# Patient Record
Sex: MALE | Race: WHITE
[De-identification: names, ages, dates, MRNs, and addresses within clinical notes are randomized per-mention and may not be internally consistent; named-entity substitution may affect disease eponyms.]

---

## 2017-03-09 VITALS — SYSTOLIC BLOOD PRESSURE: 129 MMHG | DIASTOLIC BLOOD PRESSURE: 84 MMHG

## 2017-03-13 LAB — HGB BLD-MCNC: 16.6 G/DL (ref 13.7–18)

## 2017-03-14 ENCOUNTER — HOSPITAL ENCOUNTER (OUTPATIENT)
Dept: HOSPITAL 8 - OUT | Age: 42
Discharge: HOME | End: 2017-03-14
Attending: ORTHOPAEDIC SURGERY
Payer: COMMERCIAL

## 2017-03-14 ENCOUNTER — HOSPITAL ENCOUNTER (INPATIENT)
Dept: HOSPITAL 8 - ED | Age: 42
LOS: 2 days | Discharge: HOME | DRG: 189 | End: 2017-03-16
Attending: FAMILY MEDICINE | Admitting: INTERNAL MEDICINE
Payer: COMMERCIAL

## 2017-03-14 VITALS — BODY MASS INDEX: 37.22 KG/M2 | HEIGHT: 69 IN | WEIGHT: 251.33 LBS

## 2017-03-14 VITALS — BODY MASS INDEX: 38.86 KG/M2 | HEIGHT: 69 IN | WEIGHT: 262.35 LBS

## 2017-03-14 VITALS — SYSTOLIC BLOOD PRESSURE: 126 MMHG | DIASTOLIC BLOOD PRESSURE: 77 MMHG

## 2017-03-14 VITALS — SYSTOLIC BLOOD PRESSURE: 133 MMHG | DIASTOLIC BLOOD PRESSURE: 77 MMHG

## 2017-03-14 VITALS — DIASTOLIC BLOOD PRESSURE: 84 MMHG | SYSTOLIC BLOOD PRESSURE: 129 MMHG

## 2017-03-14 DIAGNOSIS — M51.17: Primary | ICD-10-CM

## 2017-03-14 DIAGNOSIS — M51.16: ICD-10-CM

## 2017-03-14 DIAGNOSIS — F17.210: ICD-10-CM

## 2017-03-14 DIAGNOSIS — Z79.899: ICD-10-CM

## 2017-03-14 DIAGNOSIS — Z86.718: ICD-10-CM

## 2017-03-14 DIAGNOSIS — J96.01: Primary | ICD-10-CM

## 2017-03-14 DIAGNOSIS — Z80.9: ICD-10-CM

## 2017-03-14 DIAGNOSIS — E66.01: ICD-10-CM

## 2017-03-14 DIAGNOSIS — D72.829: ICD-10-CM

## 2017-03-14 DIAGNOSIS — J45.909: ICD-10-CM

## 2017-03-14 DIAGNOSIS — Z53.8: ICD-10-CM

## 2017-03-14 DIAGNOSIS — J45.901: ICD-10-CM

## 2017-03-14 LAB
AST SERPL-CCNC: 22 U/L (ref 15–37)
BUN SERPL-MCNC: 17 MG/DL (ref 7–18)
HGB BLD-MCNC: 15.8 G/DL (ref 13.7–18)

## 2017-03-14 PROCEDURE — 71010: CPT

## 2017-03-14 PROCEDURE — 80048 BASIC METABOLIC PNL TOTAL CA: CPT

## 2017-03-14 PROCEDURE — 73701 CT LOWER EXTREMITY W/DYE: CPT

## 2017-03-14 PROCEDURE — 94640 AIRWAY INHALATION TREATMENT: CPT

## 2017-03-14 PROCEDURE — 71275 CT ANGIOGRAPHY CHEST: CPT

## 2017-03-14 PROCEDURE — 85025 COMPLETE CBC W/AUTO DIFF WBC: CPT

## 2017-03-14 PROCEDURE — 93970 EXTREMITY STUDY: CPT

## 2017-03-14 PROCEDURE — 36415 COLL VENOUS BLD VENIPUNCTURE: CPT

## 2017-03-14 PROCEDURE — 80053 COMPREHEN METABOLIC PANEL: CPT

## 2017-03-14 PROCEDURE — 85379 FIBRIN DEGRADATION QUANT: CPT

## 2017-03-14 PROCEDURE — 93005 ELECTROCARDIOGRAM TRACING: CPT

## 2017-03-14 RX ADMIN — SODIUM CHLORIDE, PRESERVATIVE FREE SCH ML: 5 INJECTION INTRAVENOUS at 20:28

## 2017-03-14 RX ADMIN — OXYCODONE HYDROCHLORIDE AND ACETAMINOPHEN PRN TAB: 10; 325 TABLET ORAL at 17:19

## 2017-03-14 RX ADMIN — GABAPENTIN SCH MG: 300 CAPSULE ORAL at 17:18

## 2017-03-14 RX ADMIN — GABAPENTIN SCH MG: 300 CAPSULE ORAL at 20:27

## 2017-03-14 RX ADMIN — NICOTINE SCH PATCH: 7 PATCH, EXTENDED RELEASE TRANSDERMAL at 16:34

## 2017-03-14 RX ADMIN — OXYCODONE HYDROCHLORIDE AND ACETAMINOPHEN PRN TAB: 10; 325 TABLET ORAL at 20:33

## 2017-03-14 RX ADMIN — HEPARIN SODIUM SCH UNITS: 5000 INJECTION, SOLUTION INTRAVENOUS; SUBCUTANEOUS at 20:27

## 2017-03-14 RX ADMIN — HEPARIN SODIUM SCH UNITS: 5000 INJECTION, SOLUTION INTRAVENOUS; SUBCUTANEOUS at 16:30

## 2017-03-14 RX ADMIN — ALBUTEROL SULFATE PRN MG: 2.5 SOLUTION RESPIRATORY (INHALATION) at 19:25

## 2017-03-15 VITALS — SYSTOLIC BLOOD PRESSURE: 132 MMHG | DIASTOLIC BLOOD PRESSURE: 83 MMHG

## 2017-03-15 VITALS — SYSTOLIC BLOOD PRESSURE: 135 MMHG | DIASTOLIC BLOOD PRESSURE: 81 MMHG

## 2017-03-15 VITALS — SYSTOLIC BLOOD PRESSURE: 133 MMHG | DIASTOLIC BLOOD PRESSURE: 66 MMHG

## 2017-03-15 VITALS — SYSTOLIC BLOOD PRESSURE: 132 MMHG | DIASTOLIC BLOOD PRESSURE: 93 MMHG

## 2017-03-15 LAB
BUN SERPL-MCNC: 18 MG/DL (ref 7–18)
HGB BLD-MCNC: 16 G/DL (ref 13.7–18)

## 2017-03-15 RX ADMIN — HEPARIN SODIUM SCH UNITS: 5000 INJECTION, SOLUTION INTRAVENOUS; SUBCUTANEOUS at 13:52

## 2017-03-15 RX ADMIN — HEPARIN SODIUM SCH UNITS: 5000 INJECTION, SOLUTION INTRAVENOUS; SUBCUTANEOUS at 20:09

## 2017-03-15 RX ADMIN — NICOTINE SCH PATCH: 7 PATCH, EXTENDED RELEASE TRANSDERMAL at 16:28

## 2017-03-15 RX ADMIN — GABAPENTIN SCH MG: 300 CAPSULE ORAL at 08:06

## 2017-03-15 RX ADMIN — ALBUTEROL SULFATE SCH MG: 2.5 SOLUTION RESPIRATORY (INHALATION) at 19:27

## 2017-03-15 RX ADMIN — HEPARIN SODIUM SCH UNITS: 5000 INJECTION, SOLUTION INTRAVENOUS; SUBCUTANEOUS at 05:18

## 2017-03-15 RX ADMIN — OXYCODONE HYDROCHLORIDE AND ACETAMINOPHEN PRN TAB: 10; 325 TABLET ORAL at 16:28

## 2017-03-15 RX ADMIN — ALBUTEROL SULFATE SCH MG: 2.5 SOLUTION RESPIRATORY (INHALATION) at 06:51

## 2017-03-15 RX ADMIN — ALBUTEROL SULFATE SCH MG: 2.5 SOLUTION RESPIRATORY (INHALATION) at 14:53

## 2017-03-15 RX ADMIN — ALBUTEROL SULFATE PRN MG: 2.5 SOLUTION RESPIRATORY (INHALATION) at 04:35

## 2017-03-15 RX ADMIN — OXYCODONE HYDROCHLORIDE AND ACETAMINOPHEN PRN TAB: 10; 325 TABLET ORAL at 03:08

## 2017-03-15 RX ADMIN — OXYCODONE HYDROCHLORIDE AND ACETAMINOPHEN PRN TAB: 10; 325 TABLET ORAL at 09:29

## 2017-03-15 RX ADMIN — OXYCODONE HYDROCHLORIDE AND ACETAMINOPHEN PRN TAB: 10; 325 TABLET ORAL at 23:57

## 2017-03-15 RX ADMIN — GABAPENTIN SCH MG: 300 CAPSULE ORAL at 16:28

## 2017-03-15 RX ADMIN — SODIUM CHLORIDE, PRESERVATIVE FREE SCH ML: 5 INJECTION INTRAVENOUS at 20:09

## 2017-03-15 RX ADMIN — GABAPENTIN SCH MG: 300 CAPSULE ORAL at 20:09

## 2017-03-15 RX ADMIN — SODIUM CHLORIDE, PRESERVATIVE FREE SCH ML: 5 INJECTION INTRAVENOUS at 08:06

## 2017-03-15 RX ADMIN — FLUTICASONE FUROATE AND VILANTEROL TRIFENATATE SCH PUFF: 100; 25 POWDER RESPIRATORY (INHALATION) at 09:00

## 2017-03-15 RX ADMIN — ALBUTEROL SULFATE SCH MG: 2.5 SOLUTION RESPIRATORY (INHALATION) at 10:23

## 2017-03-16 VITALS — DIASTOLIC BLOOD PRESSURE: 89 MMHG | SYSTOLIC BLOOD PRESSURE: 138 MMHG

## 2017-03-16 VITALS — DIASTOLIC BLOOD PRESSURE: 79 MMHG | SYSTOLIC BLOOD PRESSURE: 137 MMHG

## 2017-03-16 VITALS — SYSTOLIC BLOOD PRESSURE: 125 MMHG | DIASTOLIC BLOOD PRESSURE: 81 MMHG

## 2017-03-16 VITALS — DIASTOLIC BLOOD PRESSURE: 85 MMHG | SYSTOLIC BLOOD PRESSURE: 133 MMHG

## 2017-03-16 RX ADMIN — FLUTICASONE FUROATE AND VILANTEROL TRIFENATATE SCH PUFF: 100; 25 POWDER RESPIRATORY (INHALATION) at 09:00

## 2017-03-16 RX ADMIN — GABAPENTIN SCH MG: 300 CAPSULE ORAL at 09:10

## 2017-03-16 RX ADMIN — HEPARIN SODIUM SCH UNITS: 5000 INJECTION, SOLUTION INTRAVENOUS; SUBCUTANEOUS at 04:34

## 2017-03-16 RX ADMIN — HEPARIN SODIUM SCH UNITS: 5000 INJECTION, SOLUTION INTRAVENOUS; SUBCUTANEOUS at 12:30

## 2017-03-16 RX ADMIN — SODIUM CHLORIDE, PRESERVATIVE FREE SCH ML: 5 INJECTION INTRAVENOUS at 09:10

## 2017-03-16 RX ADMIN — ALBUTEROL SULFATE SCH MG: 2.5 SOLUTION RESPIRATORY (INHALATION) at 09:55

## 2017-03-16 RX ADMIN — ALBUTEROL SULFATE SCH MG: 2.5 SOLUTION RESPIRATORY (INHALATION) at 14:01

## 2017-03-16 RX ADMIN — OXYCODONE HYDROCHLORIDE AND ACETAMINOPHEN PRN TAB: 10; 325 TABLET ORAL at 15:06

## 2017-03-16 RX ADMIN — ALBUTEROL SULFATE SCH MG: 2.5 SOLUTION RESPIRATORY (INHALATION) at 07:04

## 2017-03-16 RX ADMIN — OXYCODONE HYDROCHLORIDE AND ACETAMINOPHEN PRN TAB: 10; 325 TABLET ORAL at 09:14

## 2017-06-08 ENCOUNTER — HOSPITAL ENCOUNTER (OUTPATIENT)
Dept: HOSPITAL 8 - STAR | Age: 42
Discharge: HOME | End: 2017-06-08
Attending: ORTHOPAEDIC SURGERY
Payer: COMMERCIAL

## 2017-06-08 DIAGNOSIS — M54.17: ICD-10-CM

## 2017-06-08 DIAGNOSIS — M48.06: ICD-10-CM

## 2017-06-08 DIAGNOSIS — R79.1: ICD-10-CM

## 2017-06-08 DIAGNOSIS — M51.26: ICD-10-CM

## 2017-06-08 DIAGNOSIS — M51.27: ICD-10-CM

## 2017-06-08 DIAGNOSIS — Z01.818: Primary | ICD-10-CM

## 2017-06-08 LAB
AST SERPL-CCNC: 35 U/L (ref 15–37)
BUN SERPL-MCNC: 19 MG/DL (ref 7–18)

## 2017-06-08 PROCEDURE — 80053 COMPREHEN METABOLIC PANEL: CPT

## 2017-06-08 PROCEDURE — 93005 ELECTROCARDIOGRAM TRACING: CPT

## 2017-06-08 PROCEDURE — 85730 THROMBOPLASTIN TIME PARTIAL: CPT

## 2017-06-08 PROCEDURE — 81003 URINALYSIS AUTO W/O SCOPE: CPT

## 2017-06-08 PROCEDURE — 85025 COMPLETE CBC W/AUTO DIFF WBC: CPT

## 2017-06-08 PROCEDURE — 85610 PROTHROMBIN TIME: CPT

## 2017-06-08 PROCEDURE — 85651 RBC SED RATE NONAUTOMATED: CPT

## 2017-06-08 PROCEDURE — 36415 COLL VENOUS BLD VENIPUNCTURE: CPT

## 2017-06-20 ENCOUNTER — HOSPITAL ENCOUNTER (OUTPATIENT)
Dept: HOSPITAL 8 - OUT | Age: 42
Discharge: HOME | End: 2017-06-20
Attending: ORTHOPAEDIC SURGERY
Payer: COMMERCIAL

## 2017-06-20 VITALS — SYSTOLIC BLOOD PRESSURE: 137 MMHG | DIASTOLIC BLOOD PRESSURE: 82 MMHG

## 2017-06-20 VITALS — BODY MASS INDEX: 38.66 KG/M2 | WEIGHT: 270.07 LBS | HEIGHT: 70 IN

## 2017-06-20 DIAGNOSIS — M48.07: Primary | ICD-10-CM

## 2017-06-20 DIAGNOSIS — M47.26: ICD-10-CM

## 2017-06-20 DIAGNOSIS — Z87.891: ICD-10-CM

## 2017-06-20 DIAGNOSIS — J44.9: ICD-10-CM

## 2017-06-20 DIAGNOSIS — J45.909: ICD-10-CM

## 2017-06-20 DIAGNOSIS — M41.86: ICD-10-CM

## 2017-06-20 DIAGNOSIS — G47.33: ICD-10-CM

## 2017-06-20 DIAGNOSIS — E66.01: ICD-10-CM

## 2017-06-20 PROCEDURE — 63030 LAMOT DCMPRN NRV RT 1 LMBR: CPT

## 2017-06-20 PROCEDURE — 94640 AIRWAY INHALATION TREATMENT: CPT

## 2017-06-20 PROCEDURE — 63035 LAMOT DCMPRN NRV RT EA ADDL: CPT

## 2017-06-20 PROCEDURE — 72100 X-RAY EXAM L-S SPINE 2/3 VWS: CPT

## 2017-06-20 RX ADMIN — LABETALOL HYDROCHLORIDE PRN MG: 5 INJECTION INTRAVENOUS at 15:25

## 2017-06-20 RX ADMIN — LABETALOL HYDROCHLORIDE PRN MG: 5 INJECTION INTRAVENOUS at 15:13

## 2017-06-20 RX ADMIN — HYDRALAZINE HYDROCHLORIDE PRN MG: 20 INJECTION INTRAMUSCULAR; INTRAVENOUS at 14:44

## 2017-06-20 RX ADMIN — HYDRALAZINE HYDROCHLORIDE PRN MG: 20 INJECTION INTRAMUSCULAR; INTRAVENOUS at 14:20

## 2017-06-20 RX ADMIN — LABETALOL HYDROCHLORIDE PRN MG: 5 INJECTION INTRAVENOUS at 14:57

## 2017-06-22 ENCOUNTER — HOSPITAL ENCOUNTER (EMERGENCY)
Dept: HOSPITAL 8 - ED | Age: 42
Discharge: HOME | End: 2017-06-22
Payer: COMMERCIAL

## 2017-06-22 VITALS — DIASTOLIC BLOOD PRESSURE: 84 MMHG | SYSTOLIC BLOOD PRESSURE: 135 MMHG

## 2017-06-22 VITALS — WEIGHT: 275.58 LBS | HEIGHT: 69 IN | BODY MASS INDEX: 40.82 KG/M2

## 2017-06-22 DIAGNOSIS — R60.0: ICD-10-CM

## 2017-06-22 DIAGNOSIS — M51.26: ICD-10-CM

## 2017-06-22 DIAGNOSIS — J45.909: ICD-10-CM

## 2017-06-22 DIAGNOSIS — M51.36: Primary | ICD-10-CM

## 2017-06-22 LAB — BUN SERPL-MCNC: 18 MG/DL (ref 7–18)

## 2017-06-22 PROCEDURE — 85025 COMPLETE CBC W/AUTO DIFF WBC: CPT

## 2017-06-22 PROCEDURE — 99285 EMERGENCY DEPT VISIT HI MDM: CPT

## 2017-06-22 PROCEDURE — 96375 TX/PRO/DX INJ NEW DRUG ADDON: CPT

## 2017-06-22 PROCEDURE — 80048 BASIC METABOLIC PNL TOTAL CA: CPT

## 2017-06-22 PROCEDURE — 96361 HYDRATE IV INFUSION ADD-ON: CPT

## 2017-06-22 PROCEDURE — 96374 THER/PROPH/DIAG INJ IV PUSH: CPT

## 2017-06-22 PROCEDURE — 36415 COLL VENOUS BLD VENIPUNCTURE: CPT

## 2017-06-22 PROCEDURE — 72158 MRI LUMBAR SPINE W/O & W/DYE: CPT

## 2017-06-22 PROCEDURE — A9585 GADOBUTROL INJECTION: HCPCS

## 2017-06-22 PROCEDURE — 82040 ASSAY OF SERUM ALBUMIN: CPT

## 2017-06-22 PROCEDURE — 93970 EXTREMITY STUDY: CPT

## 2018-01-29 ENCOUNTER — OFFICE VISIT (OUTPATIENT)
Dept: URGENT CARE | Facility: PHYSICIAN GROUP | Age: 43
End: 2018-01-29
Payer: COMMERCIAL

## 2018-01-29 VITALS
HEART RATE: 104 BPM | HEIGHT: 69 IN | TEMPERATURE: 97.4 F | OXYGEN SATURATION: 91 % | WEIGHT: 272 LBS | DIASTOLIC BLOOD PRESSURE: 78 MMHG | SYSTOLIC BLOOD PRESSURE: 104 MMHG | RESPIRATION RATE: 16 BRPM | BODY MASS INDEX: 40.29 KG/M2

## 2018-01-29 DIAGNOSIS — J22 LOWER RESPIRATORY INFECTION: ICD-10-CM

## 2018-01-29 DIAGNOSIS — R06.2 WHEEZING: ICD-10-CM

## 2018-01-29 LAB
FLUAV+FLUBV AG SPEC QL IA: NEGATIVE
INT CON NEG: NEGATIVE
INT CON POS: POSITIVE

## 2018-01-29 PROCEDURE — 87804 INFLUENZA ASSAY W/OPTIC: CPT | Performed by: PHYSICIAN ASSISTANT

## 2018-01-29 PROCEDURE — 99204 OFFICE O/P NEW MOD 45 MIN: CPT | Performed by: PHYSICIAN ASSISTANT

## 2018-01-29 RX ORDER — BENZONATATE 100 MG/1
100-200 CAPSULE ORAL 3 TIMES DAILY PRN
Qty: 60 CAP | Refills: 0 | Status: SHIPPED | OUTPATIENT
Start: 2018-01-29 | End: 2020-08-09

## 2018-01-29 RX ORDER — METHYLPREDNISOLONE 4 MG/1
TABLET ORAL
Qty: 1 KIT | Refills: 0 | Status: SHIPPED | OUTPATIENT
Start: 2018-01-29 | End: 2020-08-09

## 2018-01-29 RX ORDER — AZITHROMYCIN 250 MG/1
TABLET, FILM COATED ORAL
Qty: 6 TAB | Refills: 0 | Status: SHIPPED | OUTPATIENT
Start: 2018-01-29 | End: 2020-08-09

## 2018-01-29 RX ORDER — IPRATROPIUM BROMIDE AND ALBUTEROL SULFATE 2.5; .5 MG/3ML; MG/3ML
3 SOLUTION RESPIRATORY (INHALATION) ONCE
Status: COMPLETED | OUTPATIENT
Start: 2018-01-29 | End: 2018-01-29

## 2018-01-29 RX ORDER — ALBUTEROL SULFATE 90 UG/1
2 AEROSOL, METERED RESPIRATORY (INHALATION) EVERY 6 HOURS PRN
Qty: 8.5 G | Refills: 0 | Status: SHIPPED | OUTPATIENT
Start: 2018-01-29 | End: 2020-08-09

## 2018-01-29 RX ADMIN — IPRATROPIUM BROMIDE AND ALBUTEROL SULFATE 3 ML: 2.5; .5 SOLUTION RESPIRATORY (INHALATION) at 18:22

## 2018-01-31 ASSESSMENT — ENCOUNTER SYMPTOMS
NAUSEA: 0
COUGH: 1
DIZZINESS: 0
FEVER: 0
HEADACHES: 0
MYALGIAS: 1
SPUTUM PRODUCTION: 0
SHORTNESS OF BREATH: 1
CHILLS: 1
ABDOMINAL PAIN: 0
DIARRHEA: 0
WHEEZING: 1
VOMITING: 0
SORE THROAT: 0

## 2018-01-31 ASSESSMENT — COPD QUESTIONNAIRES: COPD: 0

## 2018-02-01 NOTE — PROGRESS NOTES
"Subjective:      King Heart is a 42 y.o. male who presents with URI            Cough   This is a new problem. The current episode started 1 to 4 weeks ago (3 weeks). The problem has been waxing and waning. The cough is productive of sputum. Associated symptoms include chills, myalgias, shortness of breath and wheezing. Pertinent negatives include no chest pain, ear pain, fever, headaches, rash or sore throat. The symptoms are aggravated by lying down. He has tried OTC cough suppressant and a beta-agonist inhaler for the symptoms. The treatment provided mild relief. There is no history of asthma, COPD or pneumonia.     Patient reports his daughter is currently sick with similar symptoms, although he has been sick for about 3 weeks ago and her symptoms just started a couple days ago. He has no history of chronic lung disease or history of pneumonia. No recent use of antibiotics. No pertinent family medical history.     Review of Systems   Constitutional: Positive for chills. Negative for fever.   HENT: Positive for congestion. Negative for ear pain and sore throat.    Respiratory: Positive for cough, shortness of breath and wheezing. Negative for sputum production.    Cardiovascular: Negative for chest pain.   Gastrointestinal: Negative for abdominal pain, diarrhea, nausea and vomiting.   Genitourinary: Negative.    Musculoskeletal: Positive for myalgias.   Skin: Negative for rash.   Neurological: Negative for dizziness and headaches.   All other systems reviewed and are negative.         Objective:     /78   Pulse (!) 104   Temp 36.3 °C (97.4 °F)   Resp 16   Ht 1.753 m (5' 9\")   Wt 123.4 kg (272 lb)   SpO2 91%   BMI 40.17 kg/m²      Physical Exam   Constitutional: He is oriented to person, place, and time. He appears well-developed and well-nourished. No distress.   HENT:   Head: Normocephalic and atraumatic.   Right Ear: Hearing, tympanic membrane, external ear and ear canal normal.   Left Ear: " Hearing, tympanic membrane, external ear and ear canal normal.   Mouth/Throat: Oropharynx is clear and moist. No oropharyngeal exudate, posterior oropharyngeal edema or posterior oropharyngeal erythema.   Eyes: Conjunctivae are normal. Pupils are equal, round, and reactive to light. Right eye exhibits no discharge. Left eye exhibits no discharge.   Neck: Normal range of motion.   Cardiovascular: Normal rate, regular rhythm and normal heart sounds.    No murmur heard.  Pulmonary/Chest: Effort normal. No respiratory distress. He has wheezes. He has no rales.   Musculoskeletal: Normal range of motion.   Lymphadenopathy:     He has no cervical adenopathy.   Neurological: He is alert and oriented to person, place, and time.   Skin: Skin is warm. He is diaphoretic.   Psychiatric: He has a normal mood and affect. His behavior is normal.   Nursing note and vitals reviewed.            PMH:  has a past medical history of Asthma.  MEDS:   Current Outpatient Prescriptions:   •  Mometasone Furo-Formoterol Fum (DULERA INH), Inhale  by mouth., Disp: , Rfl:   •  azithromycin (ZITHROMAX) 250 MG Tab, Take 2 tablets by mouth on day one, then 1 tablet by mouth on days two through five, Disp: 6 Tab, Rfl: 0  •  benzonatate (TESSALON) 100 MG Cap, Take 1-2 Caps by mouth 3 times a day as needed for Cough., Disp: 60 Cap, Rfl: 0  •  MethylPREDNISolone (MEDROL DOSEPAK) 4 MG Tablet Therapy Pack, Take as directed, Disp: 1 Kit, Rfl: 0  •  albuterol 108 (90 Base) MCG/ACT Aero Soln inhalation aerosol, Inhale 2 Puffs by mouth every 6 hours as needed for Shortness of Breath., Disp: 8.5 g, Rfl: 0  •  albuterol (VENTOLIN OR PROVENTIL) 108 (90 BASE) MCG/ACT AERS, Inhale 2 Puffs by mouth every 6 hours as needed for Shortness of Breath., Disp: 1 Inhaler, Rfl: 0  •  EPINEPHrine Base (PRIMATENE MIST INH), Inhale  by mouth., Disp: , Rfl:   •  chlorpheniramine-hydrocodone (TUSSIONEX) 8-10 MG/5ML suspension, Take 5 mL by mouth every 12 hours as needed for  Cough., Disp: 50 mL, Rfl: 0  ALLERGIES: No Known Allergies  SURGHX: History reviewed. No pertinent surgical history.  SOCHX:  reports that he has never smoked. He does not have any smokeless tobacco history on file.  FH: family history is not on file.    Assessment/Plan:     1. Lower respiratory infection  - POCT Influenza A/B: NEGATIVE  - azithromycin (ZITHROMAX) 250 MG Tab; Take 2 tablets by mouth on day one, then 1 tablet by mouth on days two through five  Dispense: 6 Tab; Refill: 0   - Complete full course of antibiotics as prescribed   - benzonatate (TESSALON) 100 MG Cap; Take 1-2 Caps by mouth 3 times a day as needed for Cough.  Dispense: 60 Cap; Refill: 0    2. Wheezing  - ipratropium-albuterol (DUONEB) nebulizer solution 3 mL; 3 mL by Nebulization route Once.   - Given in clinic with mild relief of symptoms  - MethylPREDNISolone (MEDROL DOSEPAK) 4 MG Tablet Therapy Pack; Take as directed  Dispense: 1 Kit; Refill: 0  - albuterol 108 (90 Base) MCG/ACT Aero Soln inhalation aerosol; Inhale 2 Puffs by mouth every 6 hours as needed for Shortness of Breath.  Dispense: 8.5 g; Refill: 0    Call or return to office if symptoms persist or worsen. The patient demonstrated a good understanding and agreed with the treatment plan.

## 2020-08-09 ENCOUNTER — OFFICE VISIT (OUTPATIENT)
Dept: URGENT CARE | Facility: PHYSICIAN GROUP | Age: 45
End: 2020-08-09
Payer: COMMERCIAL

## 2020-08-09 VITALS
HEIGHT: 69 IN | TEMPERATURE: 98.6 F | HEART RATE: 104 BPM | OXYGEN SATURATION: 91 % | WEIGHT: 269.8 LBS | SYSTOLIC BLOOD PRESSURE: 140 MMHG | BODY MASS INDEX: 39.96 KG/M2 | RESPIRATION RATE: 18 BRPM | DIASTOLIC BLOOD PRESSURE: 90 MMHG

## 2020-08-09 DIAGNOSIS — E66.9 OBESITY, UNSPECIFIED CLASSIFICATION, UNSPECIFIED OBESITY TYPE, UNSPECIFIED WHETHER SERIOUS COMORBIDITY PRESENT: ICD-10-CM

## 2020-08-09 DIAGNOSIS — R06.2 WHEEZING: ICD-10-CM

## 2020-08-09 DIAGNOSIS — R06.02 SOB (SHORTNESS OF BREATH): ICD-10-CM

## 2020-08-09 DIAGNOSIS — J45.40 MODERATE PERSISTENT ASTHMA, UNSPECIFIED WHETHER COMPLICATED: ICD-10-CM

## 2020-08-09 PROCEDURE — 99214 OFFICE O/P EST MOD 30 MIN: CPT | Performed by: PHYSICIAN ASSISTANT

## 2020-08-09 RX ORDER — ALBUTEROL SULFATE 2.5 MG/3ML
2.5 SOLUTION RESPIRATORY (INHALATION) EVERY 4 HOURS PRN
Qty: 30 BULLET | Refills: 1 | Status: SHIPPED | OUTPATIENT
Start: 2020-08-09 | End: 2020-08-09 | Stop reason: SDUPTHER

## 2020-08-09 RX ORDER — PREDNISONE 10 MG/1
TABLET ORAL
Qty: 1 QUANTITY SUFFICIENT | Refills: 0 | Status: SHIPPED | OUTPATIENT
Start: 2020-08-09 | End: 2020-08-09 | Stop reason: SDUPTHER

## 2020-08-09 RX ORDER — BUDESONIDE AND FORMOTEROL FUMARATE DIHYDRATE 160; 4.5 UG/1; UG/1
2 AEROSOL RESPIRATORY (INHALATION) 2 TIMES DAILY
Qty: 1 G | Refills: 5 | Status: SHIPPED | OUTPATIENT
Start: 2020-08-09 | End: 2020-08-09 | Stop reason: SDUPTHER

## 2020-08-09 RX ORDER — METHYLPREDNISOLONE SODIUM SUCCINATE 125 MG/2ML
125 INJECTION, POWDER, LYOPHILIZED, FOR SOLUTION INTRAMUSCULAR; INTRAVENOUS ONCE
Status: COMPLETED | OUTPATIENT
Start: 2020-08-09 | End: 2020-08-09

## 2020-08-09 RX ORDER — ALBUTEROL SULFATE 90 UG/1
2 AEROSOL, METERED RESPIRATORY (INHALATION) EVERY 6 HOURS PRN
Qty: 1 G | Refills: 5 | Status: SHIPPED | OUTPATIENT
Start: 2020-08-09 | End: 2020-08-09 | Stop reason: SDUPTHER

## 2020-08-09 RX ORDER — ALBUTEROL SULFATE 2.5 MG/3ML
2.5 SOLUTION RESPIRATORY (INHALATION) EVERY 4 HOURS PRN
Qty: 30 BULLET | Refills: 1 | Status: SHIPPED | OUTPATIENT
Start: 2020-08-09 | End: 2021-03-17 | Stop reason: SDUPTHER

## 2020-08-09 RX ORDER — ALBUTEROL SULFATE 90 UG/1
2 AEROSOL, METERED RESPIRATORY (INHALATION) EVERY 6 HOURS PRN
Qty: 1 G | Refills: 5 | Status: SHIPPED | OUTPATIENT
Start: 2020-08-09 | End: 2020-09-30 | Stop reason: SDUPTHER

## 2020-08-09 RX ORDER — PREDNISONE 10 MG/1
TABLET ORAL
Qty: 1 QUANTITY SUFFICIENT | Refills: 0 | Status: SHIPPED | OUTPATIENT
Start: 2020-08-09 | End: 2020-08-31

## 2020-08-09 RX ORDER — BUDESONIDE AND FORMOTEROL FUMARATE DIHYDRATE 160; 4.5 UG/1; UG/1
2 AEROSOL RESPIRATORY (INHALATION) 2 TIMES DAILY
Qty: 1 G | Refills: 5 | Status: SHIPPED | OUTPATIENT
Start: 2020-08-09 | End: 2020-09-30 | Stop reason: SDUPTHER

## 2020-08-09 RX ADMIN — METHYLPREDNISOLONE SODIUM SUCCINATE 125 MG: 125 INJECTION, POWDER, LYOPHILIZED, FOR SOLUTION INTRAMUSCULAR; INTRAVENOUS at 13:47

## 2020-08-09 NOTE — PROGRESS NOTES
Chief Complaint   Patient presents with   • Asthma     has been off his meds for months just got insurance   • Cough       HISTORY OF PRESENT ILLNESS: Patient is a 45 y.o. male who presents today for the following:    Patient comes in for medication refill.  Patient has a history of severe asthma and has not had insurance is January.  He has been out of his inhalers and has significant shortness of breath.  Patient reports a cough but states its because of his shortness of breath.  He denies fever, night sweats, chills, sputum production.  He just got insurance again and needs to make appoint with a primary care provider but nobody will see him right now because of his cough, even though is due to asthma.    There are no active problems to display for this patient.      Allergies:Patient has no known allergies.    Current Outpatient Medications Ordered in Epic   Medication Sig Dispense Refill   • albuterol 108 (90 Base) MCG/ACT Aero Soln inhalation aerosol Inhale 2 Puffs by mouth every 6 hours as needed for Shortness of Breath. 1 g 5   • budesonide-formoterol (SYMBICORT) 160-4.5 MCG/ACT Aerosol Inhale 2 Puffs by mouth 2 Times a Day. 1 g 5   • predniSONE (DELTASONE) 10 MG Tab 50 mg x 1 day; 40 mg x 1 day; 30 mg x 1 day; 20 mg x 1 day; 10 mg x 1 day 1 Quantity Sufficient 0   • albuterol (PROVENTIL) 2.5mg/3ml Nebu Soln solution for nebulization 3 mL by Nebulization route every four hours as needed for Shortness of Breath. 30 Bullet 1   • ipratropium (ATROVENT) 0.02 % Solution 1 vial via nebulizer up to 3 times daily as needed for shortness of breath 30 Each 1   • Mometasone Furo-Formoterol Fum (DULERA INH) Inhale  by mouth.     • EPINEPHrine Base (PRIMATENE MIST INH) Inhale  by mouth.       Current Facility-Administered Medications Ordered in Epic   Medication Dose Route Frequency Provider Last Rate Last Dose   • methylPREDNISolone sod succ (SOLU-MEDROL) 125 MG injection 125 mg  125 mg Intramuscular Once Subha L  "D[place] Spaulding           Past Medical History:   Diagnosis Date   • Asthma        Social History     Tobacco Use   • Smoking status: Current Some Day Smoker   • Smokeless tobacco: Never Used   Substance Use Topics   • Alcohol use: Yes     Comment: rare   • Drug use: Never       No family status information on file.   History reviewed. No pertinent family history.    Review of Systems:   Constitutional ROS: No unexpected change in weight, No weakness, No fatigue  Eye ROS: No recent significant change in vision, No eye pain, redness, discharge  Ear ROS: No drainage, No tinnitus or vertigo, No recent change in hearing  Mouth/Throat ROS: No teeth or gum problems, No bleeding gums, No tongue complaints  Neck ROS: No swollen glands, No significant pain in neck  Pulmonary ROS: Positive for shortness of breath.  Cardiovascular ROS: No diaphoresis, No edema, No palpitations  Musculoskeletal/Extremities ROS: No peripheral edema, No pain, redness or swelling on the joints  Hematologic/Lymphatic ROS: No chills, No night sweats, No weight loss  Skin/Integumentary ROS: No edema, No evidence of rash, No itching      Exam:  /90   Pulse (!) 104   Temp 37 °C (98.6 °F) (Temporal)   Resp 18   Ht 1.753 m (5' 9\")   Wt 122.4 kg (269 lb 12.8 oz)   SpO2 91%   General: Well developed, well nourished. No distress.    Eye: PERRL/EOMI; conjunctivae clear, lids normal.  ENMT: Lips without lesions, MMM. Oropharynx is clear. Bilateral TMs are within normal limits.  Pulmonary: Diffuse inspiratory expiratory wheezes.  Cardiovascular: Regular rate and rhythm without murmur.   Neurologic: Grossly nonfocal. No facial asymmetry noted.  Lymph: No cervical lymphadenopathy noted.  Skin: Warm, dry, good turgor. No rashes in visible areas.   Psych: Normal mood. Alert and oriented to person, place and time.    Solu-Medrol 125 mg IM    Assessment/Plan:  Refilling patient's medications.  Establish with primary care soon as possible.  Will contact " patient with lab results.  Nebulizer not performed in the clinic due to COVID-19.  Discussed red flags and ER precautions.  Patient states his oxygen is always low and his most recent visit prior to this 1 was 2 years ago and had a 91% oxygenation on room air as well.  1. Moderate persistent asthma, unspecified whether complicated     2. Obesity, unspecified classification, unspecified obesity type, unspecified whether serious comorbidity present  Comp Metabolic Panel    CBC WITH DIFFERENTIAL    Lipid Profile    FREE THYROXINE    TSH    VITAMIN D,25 HYDROXY   3. SOB (shortness of breath)  albuterol 108 (90 Base) MCG/ACT Aero Soln inhalation aerosol    budesonide-formoterol (SYMBICORT) 160-4.5 MCG/ACT Aerosol    methylPREDNISolone sod succ (SOLU-MEDROL) 125 MG injection 125 mg    predniSONE (DELTASONE) 10 MG Tab    albuterol (PROVENTIL) 2.5mg/3ml Nebu Soln solution for nebulization    ipratropium (ATROVENT) 0.02 % Solution   4. Wheezing  albuterol 108 (90 Base) MCG/ACT Aero Soln inhalation aerosol    budesonide-formoterol (SYMBICORT) 160-4.5 MCG/ACT Aerosol    methylPREDNISolone sod succ (SOLU-MEDROL) 125 MG injection 125 mg    predniSONE (DELTASONE) 10 MG Tab    albuterol (PROVENTIL) 2.5mg/3ml Nebu Soln solution for nebulization    ipratropium (ATROVENT) 0.02 % Solution

## 2020-08-09 NOTE — LETTER
August 9, 2020         Patient: King Heart   YOB: 1975   Date of Visit: 8/9/2020           To Whom it May Concern:    King Heart was seen in my clinic on 8/9/2020. He may return to work 8/12/2020.    If you have any questions or concerns, please don't hesitate to call.        Sincerely,           Subha Spaulding P.A.-C.  Electronically Signed

## 2020-08-31 ENCOUNTER — OFFICE VISIT (OUTPATIENT)
Dept: URGENT CARE | Facility: PHYSICIAN GROUP | Age: 45
End: 2020-08-31
Payer: COMMERCIAL

## 2020-08-31 ENCOUNTER — HOSPITAL ENCOUNTER (OUTPATIENT)
Dept: LAB | Facility: MEDICAL CENTER | Age: 45
End: 2020-08-31
Attending: PHYSICIAN ASSISTANT
Payer: COMMERCIAL

## 2020-08-31 VITALS
HEIGHT: 69 IN | RESPIRATION RATE: 20 BRPM | BODY MASS INDEX: 43.25 KG/M2 | DIASTOLIC BLOOD PRESSURE: 90 MMHG | SYSTOLIC BLOOD PRESSURE: 144 MMHG | TEMPERATURE: 98.1 F | WEIGHT: 292 LBS | OXYGEN SATURATION: 91 % | HEART RATE: 92 BPM

## 2020-08-31 DIAGNOSIS — R73.03 PRE-DIABETES: ICD-10-CM

## 2020-08-31 DIAGNOSIS — R06.2 WHEEZING: ICD-10-CM

## 2020-08-31 DIAGNOSIS — R06.02 SOB (SHORTNESS OF BREATH): ICD-10-CM

## 2020-08-31 DIAGNOSIS — J32.9 RHINOSINUSITIS: ICD-10-CM

## 2020-08-31 DIAGNOSIS — I10 HYPERTENSION, UNSPECIFIED TYPE: ICD-10-CM

## 2020-08-31 DIAGNOSIS — J45.909 UNCOMPLICATED ASTHMA, UNSPECIFIED ASTHMA SEVERITY, UNSPECIFIED WHETHER PERSISTENT: ICD-10-CM

## 2020-08-31 DIAGNOSIS — E66.9 OBESITY, UNSPECIFIED CLASSIFICATION, UNSPECIFIED OBESITY TYPE, UNSPECIFIED WHETHER SERIOUS COMORBIDITY PRESENT: ICD-10-CM

## 2020-08-31 LAB
25(OH)D3 SERPL-MCNC: 28 NG/ML (ref 30–100)
ALBUMIN SERPL BCP-MCNC: 4.1 G/DL (ref 3.2–4.9)
ALBUMIN/GLOB SERPL: 1.4 G/DL
ALP SERPL-CCNC: 74 U/L (ref 30–99)
ALT SERPL-CCNC: 68 U/L (ref 2–50)
ANION GAP SERPL CALC-SCNC: 12 MMOL/L (ref 7–16)
AST SERPL-CCNC: 45 U/L (ref 12–45)
BASOPHILS # BLD AUTO: 0.5 % (ref 0–1.8)
BASOPHILS # BLD: 0.07 K/UL (ref 0–0.12)
BILIRUB SERPL-MCNC: 0.5 MG/DL (ref 0.1–1.5)
BUN SERPL-MCNC: 10 MG/DL (ref 8–22)
CALCIUM SERPL-MCNC: 9.3 MG/DL (ref 8.5–10.5)
CHLORIDE SERPL-SCNC: 100 MMOL/L (ref 96–112)
CHOLEST SERPL-MCNC: 187 MG/DL (ref 100–199)
CO2 SERPL-SCNC: 27 MMOL/L (ref 20–33)
COVID ORDER STATUS COVID19: NORMAL
CREAT SERPL-MCNC: 0.75 MG/DL (ref 0.5–1.4)
EOSINOPHIL # BLD AUTO: 0.26 K/UL (ref 0–0.51)
EOSINOPHIL NFR BLD: 2 % (ref 0–6.9)
ERYTHROCYTE [DISTWIDTH] IN BLOOD BY AUTOMATED COUNT: 45.3 FL (ref 35.9–50)
FASTING STATUS PATIENT QL REPORTED: NORMAL
GLOBULIN SER CALC-MCNC: 2.9 G/DL (ref 1.9–3.5)
GLUCOSE BLD-MCNC: 89 MG/DL (ref 70–100)
GLUCOSE SERPL-MCNC: 103 MG/DL (ref 65–99)
HCT VFR BLD AUTO: 51.4 % (ref 42–52)
HDLC SERPL-MCNC: 43 MG/DL
HGB BLD-MCNC: 16.6 G/DL (ref 14–18)
IMM GRANULOCYTES # BLD AUTO: 0.08 K/UL (ref 0–0.11)
IMM GRANULOCYTES NFR BLD AUTO: 0.6 % (ref 0–0.9)
LDLC SERPL CALC-MCNC: 113 MG/DL
LYMPHOCYTES # BLD AUTO: 2.44 K/UL (ref 1–4.8)
LYMPHOCYTES NFR BLD: 18.8 % (ref 22–41)
MCH RBC QN AUTO: 30.5 PG (ref 27–33)
MCHC RBC AUTO-ENTMCNC: 32.3 G/DL (ref 33.7–35.3)
MCV RBC AUTO: 94.5 FL (ref 81.4–97.8)
MONOCYTES # BLD AUTO: 1.31 K/UL (ref 0–0.85)
MONOCYTES NFR BLD AUTO: 10.1 % (ref 0–13.4)
NEUTROPHILS # BLD AUTO: 8.83 K/UL (ref 1.82–7.42)
NEUTROPHILS NFR BLD: 68 % (ref 44–72)
NRBC # BLD AUTO: 0 K/UL
NRBC BLD-RTO: 0 /100 WBC
PLATELET # BLD AUTO: 226 K/UL (ref 164–446)
PMV BLD AUTO: 11.9 FL (ref 9–12.9)
POTASSIUM SERPL-SCNC: 4.3 MMOL/L (ref 3.6–5.5)
PROT SERPL-MCNC: 7 G/DL (ref 6–8.2)
RBC # BLD AUTO: 5.44 M/UL (ref 4.7–6.1)
SARS-COV-2 RNA RESP QL NAA+PROBE: NOTDETECTED
SODIUM SERPL-SCNC: 139 MMOL/L (ref 135–145)
SPECIMEN SOURCE: NORMAL
T4 FREE SERPL-MCNC: 1.07 NG/DL (ref 0.93–1.7)
TRIGL SERPL-MCNC: 154 MG/DL (ref 0–149)
TSH SERPL DL<=0.005 MIU/L-ACNC: 1.52 UIU/ML (ref 0.38–5.33)
WBC # BLD AUTO: 13 K/UL (ref 4.8–10.8)

## 2020-08-31 PROCEDURE — 80053 COMPREHEN METABOLIC PANEL: CPT

## 2020-08-31 PROCEDURE — U0003 INFECTIOUS AGENT DETECTION BY NUCLEIC ACID (DNA OR RNA); SEVERE ACUTE RESPIRATORY SYNDROME CORONAVIRUS 2 (SARS-COV-2) (CORONAVIRUS DISEASE [COVID-19]), AMPLIFIED PROBE TECHNIQUE, MAKING USE OF HIGH THROUGHPUT TECHNOLOGIES AS DESCRIBED BY CMS-2020-01-R: HCPCS

## 2020-08-31 PROCEDURE — 84439 ASSAY OF FREE THYROXINE: CPT

## 2020-08-31 PROCEDURE — 82306 VITAMIN D 25 HYDROXY: CPT

## 2020-08-31 PROCEDURE — 84443 ASSAY THYROID STIM HORMONE: CPT

## 2020-08-31 PROCEDURE — 36415 COLL VENOUS BLD VENIPUNCTURE: CPT

## 2020-08-31 PROCEDURE — 85025 COMPLETE CBC W/AUTO DIFF WBC: CPT

## 2020-08-31 PROCEDURE — 99214 OFFICE O/P EST MOD 30 MIN: CPT | Performed by: PHYSICIAN ASSISTANT

## 2020-08-31 PROCEDURE — 80061 LIPID PANEL: CPT

## 2020-08-31 PROCEDURE — 82962 GLUCOSE BLOOD TEST: CPT | Performed by: PHYSICIAN ASSISTANT

## 2020-08-31 RX ORDER — AMOXICILLIN AND CLAVULANATE POTASSIUM 875; 125 MG/1; MG/1
1 TABLET, FILM COATED ORAL 2 TIMES DAILY
Qty: 14 TAB | Refills: 0 | Status: SHIPPED | OUTPATIENT
Start: 2020-08-31 | End: 2020-09-07

## 2020-08-31 RX ORDER — METHYLPREDNISOLONE SODIUM SUCCINATE 125 MG/2ML
125 INJECTION, POWDER, LYOPHILIZED, FOR SOLUTION INTRAMUSCULAR; INTRAVENOUS ONCE
Status: COMPLETED | OUTPATIENT
Start: 2020-08-31 | End: 2020-08-31

## 2020-08-31 RX ORDER — PREDNISONE 10 MG/1
TABLET ORAL
Qty: 1 QUANTITY SUFFICIENT | Refills: 0 | Status: SHIPPED | OUTPATIENT
Start: 2020-08-31 | End: 2020-09-17

## 2020-08-31 RX ORDER — MONTELUKAST SODIUM 10 MG/1
10 TABLET ORAL DAILY
Qty: 30 TAB | Refills: 4 | Status: SHIPPED | OUTPATIENT
Start: 2020-08-31 | End: 2021-03-17

## 2020-08-31 RX ADMIN — METHYLPREDNISOLONE SODIUM SUCCINATE 125 MG: 125 INJECTION, POWDER, LYOPHILIZED, FOR SOLUTION INTRAMUSCULAR; INTRAVENOUS at 16:09

## 2020-08-31 NOTE — LETTER
August 31, 2020         Patient: King Heart   YOB: 1975   Date of Visit: 8/31/2020           To Whom it May Concern:    King Heart was seen in my clinic on 8/31/2020. He may return to work without restrictions 9/3/2020.    If you have any questions or concerns, please don't hesitate to call.        Sincerely,           Subha Spaulding P.A.-C.  Electronically Signed
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
## 2020-09-17 ENCOUNTER — OFFICE VISIT (OUTPATIENT)
Dept: MEDICAL GROUP | Facility: PHYSICIAN GROUP | Age: 45
End: 2020-09-17
Payer: COMMERCIAL

## 2020-09-17 VITALS
BODY MASS INDEX: 43.69 KG/M2 | TEMPERATURE: 99 F | RESPIRATION RATE: 26 BRPM | HEIGHT: 69 IN | SYSTOLIC BLOOD PRESSURE: 122 MMHG | WEIGHT: 295 LBS | OXYGEN SATURATION: 91 % | HEART RATE: 96 BPM | DIASTOLIC BLOOD PRESSURE: 72 MMHG

## 2020-09-17 DIAGNOSIS — J45.40 MODERATE PERSISTENT ASTHMA WITHOUT COMPLICATION: ICD-10-CM

## 2020-09-17 DIAGNOSIS — M54.50 CHRONIC MIDLINE LOW BACK PAIN, UNSPECIFIED WHETHER SCIATICA PRESENT: ICD-10-CM

## 2020-09-17 DIAGNOSIS — G89.29 CHRONIC MIDLINE LOW BACK PAIN, UNSPECIFIED WHETHER SCIATICA PRESENT: ICD-10-CM

## 2020-09-17 DIAGNOSIS — E78.2 MIXED HYPERLIPIDEMIA: ICD-10-CM

## 2020-09-17 DIAGNOSIS — Z23 NEED FOR VACCINATION: ICD-10-CM

## 2020-09-17 DIAGNOSIS — K45.8 OTHER SPECIFIED ABDOMINAL HERNIA WITHOUT OBSTRUCTION OR GANGRENE: ICD-10-CM

## 2020-09-17 DIAGNOSIS — G47.33 OSA (OBSTRUCTIVE SLEEP APNEA): ICD-10-CM

## 2020-09-17 PROBLEM — F32.9 MAJOR DEPRESSIVE DISORDER: Status: ACTIVE | Noted: 2020-09-17

## 2020-09-17 PROBLEM — K46.9 ABDOMINAL HERNIA: Status: ACTIVE | Noted: 2020-09-17

## 2020-09-17 PROCEDURE — 90471 IMMUNIZATION ADMIN: CPT | Performed by: NURSE PRACTITIONER

## 2020-09-17 PROCEDURE — 90732 PPSV23 VACC 2 YRS+ SUBQ/IM: CPT | Performed by: NURSE PRACTITIONER

## 2020-09-17 PROCEDURE — 90686 IIV4 VACC NO PRSV 0.5 ML IM: CPT | Performed by: NURSE PRACTITIONER

## 2020-09-17 PROCEDURE — 90472 IMMUNIZATION ADMIN EACH ADD: CPT | Performed by: NURSE PRACTITIONER

## 2020-09-17 PROCEDURE — 99214 OFFICE O/P EST MOD 30 MIN: CPT | Mod: 25 | Performed by: NURSE PRACTITIONER

## 2020-09-17 PROCEDURE — 90715 TDAP VACCINE 7 YRS/> IM: CPT | Performed by: NURSE PRACTITIONER

## 2020-09-17 RX ORDER — AZITHROMYCIN 250 MG/1
TABLET, FILM COATED ORAL
Qty: 6 TAB | Refills: 0 | Status: SHIPPED | OUTPATIENT
Start: 2020-09-17 | End: 2021-03-17

## 2020-09-17 RX ORDER — METHYLPREDNISOLONE 4 MG/1
TABLET ORAL
Qty: 21 TAB | Refills: 0 | Status: SHIPPED | OUTPATIENT
Start: 2020-09-17 | End: 2021-03-17

## 2020-09-17 RX ORDER — IBUPROFEN 800 MG
TABLET ORAL
COMMUNITY

## 2020-09-17 ASSESSMENT — PATIENT HEALTH QUESTIONNAIRE - PHQ9
CLINICAL INTERPRETATION OF PHQ2 SCORE: 3
SUM OF ALL RESPONSES TO PHQ QUESTIONS 1-9: 14
5. POOR APPETITE OR OVEREATING: 3 - NEARLY EVERY DAY

## 2020-09-17 ASSESSMENT — FIBROSIS 4 INDEX: FIB4 SCORE: 1.13

## 2020-09-17 NOTE — PATIENT INSTRUCTIONS
Aim for no more than 1500 to 1700 calories     Refer to pulmonology/sleep    Abdominal US    Flu shot    MyPlate from USDA    MyPlate is an outline of a general healthy diet based on the 2010 Dietary Guidelines for Americans, from the U.[place]. Department of Agriculture (USDA). It sets guidelines for how much food you should eat from each food group based on your age, sex, and level of physical activity.  What are tips for following MyPlate?  To follow MyPlate recommendations:  · Eat a wide variety of fruits and vegetables, grains, and protein foods.  · Serve smaller portions and eat less food throughout the day.  · Limit portion sizes to avoid overeating.  · Enjoy your food.  · Get at least 150 minutes of exercise every week. This is about 30 minutes each day, 5 or more days per week.  It can be difficult to have every meal look like MyPlate. Think about MyPlate as eating guidelines for an entire day, rather than each individual meal.  Fruits and vegetables  · Make half of your plate fruits and vegetables.  · Eat many different colors of fruits and vegetables each day.  · For a 2,000 calorie daily food plan, eat:  ? 2½ cups of vegetables every day.  ? 2 cups of fruit every day.  · 1 cup is equal to:  ? 1 cup raw or cooked vegetables.  ? 1 cup raw fruit.  ? 1 medium-sized orange, apple, or banana.  ? 1 cup 100% fruit or vegetable juice.  ? 2 cups raw leafy greens, such as lettuce, spinach, or kale.  ? ½ cup dried fruit.  Grains  · One fourth of your plate should be grains.  · Make at least half of the grains you eat each day whole grains.  · For a 2,000 calorie daily food plan, eat 6 oz of grains every day.  · 1 oz is equal to:  ? 1 slice bread.  ? 1 cup cereal.  ? ½ cup cooked rice, cereal, or pasta.  Protein  · One fourth of your plate should be protein.  · Eat a wide variety of protein foods, including meat, poultry, fish, eggs, beans, nuts, and tofu.  · For a 2,000 calorie daily food plan, eat 5½ oz of protein every  day.  · 1 oz is equal to:  ? 1 oz meat, poultry, or fish.  ? ¼ cup cooked beans.  ? 1 egg.  ? ½ oz nuts or seeds.  ? 1 Tbsp peanut butter.  Dairy  · Drink fat-free or low-fat (1%) milk.  · Eat or drink dairy as a side to meals.  · For a 2,000 calorie daily food plan, eat or drink 3 cups of dairy every day.  · 1 cup is equal to:  ? 1 cup milk, yogurt, cottage cheese, or soy milk (soy beverage).  ? 2 oz processed cheese.  ? 1½ oz natural cheese.  Fats, oils, salt, and sugars  · Only small amounts of oils are recommended.  · Avoid foods that are high in calories and low in nutritional value (empty calories), like foods high in fat or added sugars.  · Choose foods that are low in salt (sodium). Choose foods that have less than 140 milligrams (mg) of sodium per serving.  · Drink water instead of sugary drinks. Drink enough water each day to keep your urine pale yellow.  Where to find support  · Work with your health care provider or a nutrition specialist (dietitian) to develop a customized eating plan that is right for you.  · Download an lexi (mobile application) to help you track your daily food intake.  Where to find more information  · Go to ChooseMyPlate.gov for more information.  · Learn more and log your daily food intake according to MyPlate using USDA's SuperTracker: www.supertracker.usda.gov  Summary  · MyPlate is a general guideline for healthy eating from the USDA. It is based on the 2010 Dietary Guidelines for Americans.  · In general, fruits and vegetables should take up ½ of your plate, grains should take up ¼ of your plate, and protein should take up ¼ of your plate.  This information is not intended to replace advice given to you by your health care provider. Make sure you discuss any questions you have with your health care provider.  Document Released: 01/06/2009 Document Revised: 01/19/2019 Document Reviewed: 03/19/2018  Elsevier Patient Education © 2020 [employer]

## 2020-09-17 NOTE — PROGRESS NOTES
Chief Complaint   Patient presents with   • Establish Care   • Asthma         This is a 45 y.o.male patient that presents today with the following:-Care with new PCP, discuss chronic conditions    Mixed hyperlipidemia  The 10-year ASCVD risk score (Darrell SUJEY Jr., et al., 2013) is: 2.5%  Not currently a candidate for statin  However we did have long discussion regarding his weight and dietary changes  He was given printed education material from [employer]  Discussed the importance of decreasing calorie intake to no more than 1500 to 1700 delmy/day    Abdominal hernia  Patient has evidence of abdominal hernia just above umbilicus  States that at times it hurts but is reducible  He does agree to abdominal ultrasound to confirm diagnosis and understands he will likely be referred to general surgeon for further evaluation and treatment options    Chronic midline low back pain  Patient has chronic midline low back pain  Currently followed by spine specialist    Moderate persistent asthma without complication  This is a chronic condition, poorly controlled  He is taking albuterol inhaler as well as as needed nebulizer, Atrovent, recently completed steroid pack but states symptoms not significantly improved  He is also on singular 10 mg daily  He does deny purulent nasal drainage or discolored phlegm  He is requesting a second round of steroid pack, this is reasonable and has been called in  He also is requesting a new referral to pulmonology as well as sleep medicine in the setting of his asthma as well as history of obstructive sleep apnea  Referral has been placed    LAMONT (obstructive sleep apnea)  See additional notes      Hospital Outpatient Visit on 08/31/2020   Component Date Value   • 25-Hydroxy   Vitamin D 25 08/31/2020 28*   • TSH 08/31/2020 1.520    • Free T-4 08/31/2020 1.07    • Cholesterol,Tot 08/31/2020 187    • Triglycerides 08/31/2020 154*   • HDL 08/31/2020 43    • LDL 08/31/2020 113*   • WBC 08/31/2020 13.0*    • RBC 08/31/2020 5.44    • Hemoglobin 08/31/2020 16.6    • Hematocrit 08/31/2020 51.4    • MCV 08/31/2020 94.5    • MCH 08/31/2020 30.5    • MCHC 08/31/2020 32.3*   • RDW 08/31/2020 45.3    • Platelet Count 08/31/2020 226    • MPV 08/31/2020 11.9    • Neutrophils-Polys 08/31/2020 68.00    • Lymphocytes 08/31/2020 18.80*   • Monocytes 08/31/2020 10.10    • Eosinophils 08/31/2020 2.00    • Basophils 08/31/2020 0.50    • Immature Granulocytes 08/31/2020 0.60    • Nucleated RBC 08/31/2020 0.00    • Neutrophils (Absolute) 08/31/2020 8.83*   • Lymphs (Absolute) 08/31/2020 2.44    • Monos (Absolute) 08/31/2020 1.31*   • Eos (Absolute) 08/31/2020 0.26    • Baso (Absolute) 08/31/2020 0.07    • Immature Granulocytes (a* 08/31/2020 0.08    • NRBC (Absolute) 08/31/2020 0.00    • Sodium 08/31/2020 139    • Potassium 08/31/2020 4.3    • Chloride 08/31/2020 100    • Co2 08/31/2020 27    • Anion Gap 08/31/2020 12.0    • Glucose 08/31/2020 103*   • Bun 08/31/2020 10    • Creatinine 08/31/2020 0.75    • Calcium 08/31/2020 9.3    • AST(SGOT) 08/31/2020 45    • ALT(SGPT) 08/31/2020 68*   • Alkaline Phosphatase 08/31/2020 74    • Total Bilirubin 08/31/2020 0.5    • Albumin 08/31/2020 4.1    • Total Protein 08/31/2020 7.0    • Globulin 08/31/2020 2.9    • A-G Ratio 08/31/2020 1.4    • Fasting Status 08/31/2020 Fasting    • COVID Order Status 08/31/2020 Received    • SARS-CoV-2 Source 08/31/2020 Nasal Swab    • SARS-CoV-2 by PCR 08/31/2020 NotDetected    • GFR If  08/31/2020 >60    • GFR If Non  [employer]* 08/31/2020 >60    Office Visit on 08/31/2020   Component Date Value   • Glucose - Accu-Ck 08/31/2020 89          clinical course has been stable    Past Medical History:   Diagnosis Date   • Asthma        Past Surgical History:   Procedure Laterality Date   • LAMINOTOMY         No family history on file.    Patient has no known allergies.    Current Outpatient Medications Ordered in Epic   Medication Sig Dispense  "Refill   • Cholecalciferol (VITAMIN D3) 10 MCG (400 UNIT) Cap Take  by mouth.     • methylPREDNISolone (MEDROL DOSEPAK) 4 MG Tablet Therapy Pack Follow schedule on package instructions. 21 Tab 0   • azithromycin (ZITHROMAX) 250 MG Tab Take according to z-pack 6 Tab 0   • montelukast (SINGULAIR) 10 MG Tab Take 1 Tab by mouth every day. 30 Tab 4   • albuterol 108 (90 Base) MCG/ACT Aero Soln inhalation aerosol Inhale 2 Puffs by mouth every 6 hours as needed for Shortness of Breath. 1 g 5   • budesonide-formoterol (SYMBICORT) 160-4.5 MCG/ACT Aerosol Inhale 2 Puffs by mouth 2 Times a Day. 1 g 5   • albuterol (PROVENTIL) 2.5mg/3ml Nebu Soln solution for nebulization 3 mL by Nebulization route every four hours as needed for Shortness of Breath. 30 Bullet 1   • ipratropium (ATROVENT) 0.02 % Solution 1 vial via nebulizer up to 3 times daily as needed for shortness of breath 30 Each 1   • EPINEPHrine Base (PRIMATENE MIST INH) Inhale  by mouth.       No current Epic-ordered facility-administered medications on file.        Constitutional ROS: No unexpected change in weight, No weakness, No unexplained fevers, sweats, or chills  Pulmonary ROS: Positive per HPI  Cardiovascular ROS: No chest pain, No edema, No palpitations  Gastrointestinal ROS: Positive per HPI  Musculoskeletal/Extremities ROS: No clubbing, No peripheral edema, No pain, redness or swelling on the joints, Positive for chronic back pain  Neurologic ROS: Normal development, No seizures, No weakness    Physical exam:  /72   Pulse 96   Temp 37.2 °C (99 °F) (Temporal)   Resp (!) 26   Ht 1.753 m (5' 9\")   Wt (!) 133.8 kg (295 lb)   SpO2 91%   BMI 43.56 kg/m²   General Appearance: Pleasant middle-aged male, alert, no distress, morbidly obese, well-groomed  Skin: Skin color, texture, turgor normal. No rashes or lesions.  Neck: negative findings: no asymmetry, masses, or scars, no adenopathy, trachea midline and normal to palpitation  Lungs: positive " findings: Positive for few scattered wheezes posteriorly throughout  Heart: negative. RRR without murmur, gallop, or rubs.  No ectopy.  Abdomen: positive findings: Abdominal hernia just above umbilicus  Musculoskeletal: negative findings: no evidence of joint instability, no evidence of muscle atrophy, no deformities present  Neurologic: intact, CN II through XII grossly intact    Medical decision making/discussion:     Aim for no more than 1500 to 1700 calories     Refer to pulmonology/sleep    Abdominal US    Immunizations today    King YOUNG was seen today for establish care and asthma.    Diagnoses and all orders for this visit:    Moderate persistent asthma without complication    LAMONT (obstructive sleep apnea)  -     REFERRAL TO PULMONARY AND SLEEP MEDICINE General Pulmonary    Other specified abdominal hernia without obstruction or gangrene  -     US-HERNIA ABDOMEN; Future    Mixed hyperlipidemia    Chronic midline low back pain, unspecified whether sciatica present    Need for vaccination  -     Influenza Vaccine Quad Injection (PF)  -     Pneumovax Vaccine (PPSV23)  -     Tdap Vaccine =>8YO IM    Other orders  -     methylPREDNISolone (MEDROL DOSEPAK) 4 MG Tablet Therapy Pack; Follow schedule on package instructions.  -     azithromycin (ZITHROMAX) 250 MG Tab; Take according to z-pack        Return in about 2 months (around 11/17/2020) for Discuss Labs, Follow-up.        Please note that this dictation was created using voice recognition software. I have made every reasonable attempt to correct obvious errors, but I expect that there are errors of grammar and possibly content that I did not discover before finalizing the note.

## 2020-09-17 NOTE — ASSESSMENT & PLAN NOTE
The 10-year ASCVD risk score (Darrell RM Jr., et al., 2013) is: 2.5%  Not currently a candidate for statin  However we did have long discussion regarding his weight and dietary changes  He was given printed education material from [employer]  Discussed the importance of decreasing calorie intake to no more than 1500 to 1700 delmy/day

## 2020-09-21 NOTE — ASSESSMENT & PLAN NOTE
Patient has evidence of abdominal hernia just above umbilicus  States that at times it hurts but is reducible  He does agree to abdominal ultrasound to confirm diagnosis and understands he will likely be referred to general surgeon for further evaluation and treatment options

## 2020-10-01 ENCOUNTER — HOSPITAL ENCOUNTER (OUTPATIENT)
Dept: RADIOLOGY | Facility: MEDICAL CENTER | Age: 45
End: 2020-10-01
Attending: NURSE PRACTITIONER
Payer: COMMERCIAL

## 2020-10-01 DIAGNOSIS — K45.8 OTHER SPECIFIED ABDOMINAL HERNIA WITHOUT OBSTRUCTION OR GANGRENE: ICD-10-CM

## 2020-10-01 DIAGNOSIS — K43.9 VENTRAL HERNIA WITHOUT OBSTRUCTION OR GANGRENE: ICD-10-CM

## 2020-10-01 PROCEDURE — 76705 ECHO EXAM OF ABDOMEN: CPT

## 2020-11-11 ENCOUNTER — HOSPITAL ENCOUNTER (EMERGENCY)
Dept: HOSPITAL 8 - ED | Age: 45
Discharge: HOME | End: 2020-11-11
Payer: COMMERCIAL

## 2020-11-11 VITALS — BODY MASS INDEX: 43.17 KG/M2 | HEIGHT: 69 IN | WEIGHT: 291.45 LBS

## 2020-11-11 VITALS — DIASTOLIC BLOOD PRESSURE: 82 MMHG | SYSTOLIC BLOOD PRESSURE: 127 MMHG
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
## 2020-11-11 DIAGNOSIS — G89.29: ICD-10-CM

## 2020-11-11 DIAGNOSIS — S30.0XXA: ICD-10-CM
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
## 2020-11-11 DIAGNOSIS — S39.012A: Primary | ICD-10-CM

## 2020-11-11 DIAGNOSIS — F17.210: ICD-10-CM
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
## 2020-11-11 DIAGNOSIS — J45.909: ICD-10-CM
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
## 2020-11-11 PROCEDURE — 94640 AIRWAY INHALATION TREATMENT: CPT

## 2020-11-11 PROCEDURE — 99283 EMERGENCY DEPT VISIT LOW MDM: CPT

## 2020-11-11 PROCEDURE — 72110 X-RAY EXAM L-2 SPINE 4/>VWS: CPT

## 2020-11-11 NOTE — NUR
O2 SAT 89%RA.  PT USED OWN INHALER.  PER BROTHER, PT USED INHALER ABOUT 20 MINS 
AGO. PT DENIES SOB, DIZZINESS, LIGHTHEADEDNESS.  REPORTS HE USES CPAP.  O2 SAT 
INCREASES TO 93% WITH DEEP BREATHING

## 2020-11-11 NOTE — NUR
PT A&OX4, RESP EVEN & UNLABORED, SPEECH CLEAR, SKIN WNL.  PT SITTNG UPRIGHT ON 
GURNEY W/ RT LEG DANGLING OVER SIDE - STATES HE CAN'T BRING LEG UP TO BED DUE 
TO PAIN.  PT WAS ABMULATORY TO ED.  PT STATES HE FELL OFF HIS TRUCK ON TUESDAY, 
LANDED ON HIS FEET BUT HIS BACK ON TRUCK.  HAS BEEN TAKING IBUPROFEN - LAST 
DOSE 600MG @ 0415 TODAY.   HX BACK SURGERY

## 2021-03-17 ENCOUNTER — OFFICE VISIT (OUTPATIENT)
Dept: URGENT CARE | Facility: PHYSICIAN GROUP | Age: 46
End: 2021-03-17
Payer: MEDICAID

## 2021-03-17 ENCOUNTER — HOSPITAL ENCOUNTER (OUTPATIENT)
Facility: MEDICAL CENTER | Age: 46
End: 2021-03-17
Attending: NURSE PRACTITIONER
Payer: MEDICAID

## 2021-03-17 ENCOUNTER — APPOINTMENT (OUTPATIENT)
Dept: RADIOLOGY | Facility: IMAGING CENTER | Age: 46
End: 2021-03-17
Attending: NURSE PRACTITIONER
Payer: MEDICAID

## 2021-03-17 VITALS
RESPIRATION RATE: 18 BRPM | HEIGHT: 69 IN | OXYGEN SATURATION: 89 % | TEMPERATURE: 97.9 F | SYSTOLIC BLOOD PRESSURE: 126 MMHG | HEART RATE: 97 BPM | DIASTOLIC BLOOD PRESSURE: 82 MMHG | WEIGHT: 311 LBS | BODY MASS INDEX: 46.06 KG/M2

## 2021-03-17 DIAGNOSIS — J22 ACUTE LOWER RESPIRATORY INFECTION: ICD-10-CM

## 2021-03-17 DIAGNOSIS — J45.901 ASTHMA WITH ACUTE EXACERBATION, UNSPECIFIED ASTHMA SEVERITY, UNSPECIFIED WHETHER PERSISTENT: ICD-10-CM

## 2021-03-17 DIAGNOSIS — R93.89 ABNORMAL CHEST X-RAY: ICD-10-CM

## 2021-03-17 PROCEDURE — 99215 OFFICE O/P EST HI 40 MIN: CPT | Mod: 25 | Performed by: NURSE PRACTITIONER

## 2021-03-17 PROCEDURE — U0003 INFECTIOUS AGENT DETECTION BY NUCLEIC ACID (DNA OR RNA); SEVERE ACUTE RESPIRATORY SYNDROME CORONAVIRUS 2 (SARS-COV-2) (CORONAVIRUS DISEASE [COVID-19]), AMPLIFIED PROBE TECHNIQUE, MAKING USE OF HIGH THROUGHPUT TECHNOLOGIES AS DESCRIBED BY CMS-2020-01-R: HCPCS

## 2021-03-17 PROCEDURE — U0005 INFEC AGEN DETEC AMPLI PROBE: HCPCS

## 2021-03-17 PROCEDURE — 71046 X-RAY EXAM CHEST 2 VIEWS: CPT | Mod: TC,FY | Performed by: NURSE PRACTITIONER

## 2021-03-17 RX ORDER — METHYLPREDNISOLONE SODIUM SUCCINATE 125 MG/2ML
62.5 INJECTION, POWDER, LYOPHILIZED, FOR SOLUTION INTRAMUSCULAR; INTRAVENOUS ONCE
Status: COMPLETED | OUTPATIENT
Start: 2021-03-17 | End: 2021-03-17

## 2021-03-17 RX ORDER — AMOXICILLIN AND CLAVULANATE POTASSIUM 875; 125 MG/1; MG/1
1 TABLET, FILM COATED ORAL 2 TIMES DAILY
Qty: 14 TABLET | Refills: 0 | Status: SHIPPED | OUTPATIENT
Start: 2021-03-17 | End: 2021-03-24

## 2021-03-17 RX ORDER — ALBUTEROL SULFATE 90 UG/1
2 AEROSOL, METERED RESPIRATORY (INHALATION) EVERY 6 HOURS PRN
Qty: 8 G | Refills: 1 | Status: SHIPPED | OUTPATIENT
Start: 2021-03-17

## 2021-03-17 RX ORDER — BUDESONIDE AND FORMOTEROL FUMARATE DIHYDRATE 160; 4.5 UG/1; UG/1
2 AEROSOL RESPIRATORY (INHALATION) 2 TIMES DAILY
Qty: 10.2 G | Refills: 0 | Status: SHIPPED | OUTPATIENT
Start: 2021-03-17

## 2021-03-17 RX ORDER — ALBUTEROL SULFATE 2.5 MG/3ML
2.5 SOLUTION RESPIRATORY (INHALATION) EVERY 4 HOURS PRN
Qty: 30 BULLET | Refills: 0 | Status: SHIPPED | OUTPATIENT
Start: 2021-03-17

## 2021-03-17 RX ORDER — BENZONATATE 100 MG/1
100 CAPSULE ORAL 3 TIMES DAILY PRN
Qty: 60 CAPSULE | Refills: 0 | Status: SHIPPED | OUTPATIENT
Start: 2021-03-17 | End: 2022-09-25

## 2021-03-17 RX ORDER — MONTELUKAST SODIUM 10 MG/1
10 TABLET ORAL DAILY
Qty: 30 TABLET | Refills: 0 | Status: SHIPPED | OUTPATIENT
Start: 2021-03-17

## 2021-03-17 RX ORDER — PREDNISONE 10 MG/1
20 TABLET ORAL 2 TIMES DAILY
Qty: 20 TABLET | Refills: 0 | Status: SHIPPED | OUTPATIENT
Start: 2021-03-17 | End: 2021-03-22

## 2021-03-17 RX ORDER — AZITHROMYCIN 250 MG/1
TABLET, FILM COATED ORAL
Qty: 6 TABLET | Refills: 0 | Status: SHIPPED | OUTPATIENT
Start: 2021-03-17 | End: 2022-09-25

## 2021-03-17 RX ADMIN — METHYLPREDNISOLONE SODIUM SUCCINATE 62.5 MG: 125 INJECTION, POWDER, LYOPHILIZED, FOR SOLUTION INTRAMUSCULAR; INTRAVENOUS at 11:56

## 2021-03-17 ASSESSMENT — FIBROSIS 4 INDEX: FIB4 SCORE: 1.13

## 2021-03-17 NOTE — PROGRESS NOTES
Chief Complaint   Patient presents with   • Asthma     x3days    • Runny Nose   • Sore Throat       HISTORY OF PRESENT ILLNESS: Patient is a 45 y.o. male with a history of asthma who presents today due to symptoms which started three days ago. Pt reports a productive cough, rhinitis, sore throat, fatigue, malaise, wheezing, and body aches. Denies chest pain, shortness of breath, or fever. Has tried OTC cold medications without significant relief of symptoms.  He has run out of his asthma medications, has used OTC Primatene Mist for symptom relief.  He has a home pulse oximeter, since onset his oxygen has been between 89 to 91%.  No recent ABX use. No other aggravating or alleviating factors. Reports no known exposure to COVID-19.       Patient Active Problem List    Diagnosis Date Noted   • Major depressive disorder 09/17/2020   • Abdominal hernia 09/17/2020   • LAMONT (obstructive sleep apnea) 09/17/2020   • Moderate persistent asthma without complication 09/17/2020   • Mixed hyperlipidemia 09/17/2020   • Chronic midline low back pain 09/17/2020       Allergies:Patient has no known allergies.    Current Outpatient Medications Ordered in Epic   Medication Sig Dispense Refill   • albuterol (PROVENTIL) 2.5mg/3ml Nebu Soln solution for nebulization Take 3 mL by nebulization every four hours as needed for Shortness of Breath. 30 Bullet 0   • albuterol 108 (90 Base) MCG/ACT Aero Soln inhalation aerosol Inhale 2 Puffs every 6 hours as needed for Shortness of Breath. 8 g 1   • budesonide-formoterol (SYMBICORT) 160-4.5 MCG/ACT Aerosol Inhale 2 Puffs 2 Times a Day. 10.2 g 0   • montelukast (SINGULAIR) 10 MG Tab Take 1 tablet by mouth every day. 30 tablet 0   • predniSONE (DELTASONE) 10 MG Tab Take 2 Tablets by mouth 2 times a day for 5 days. Take with food. 20 tablet 0   • azithromycin (ZITHROMAX) 250 MG Tab Take two tabs on day one followed by one tab on days 2-5. 6 tablet 0   • benzonatate (TESSALON) 100 MG Cap Take 1  "capsule by mouth 3 times a day as needed for Cough. 60 capsule 0   • Cholecalciferol (VITAMIN D3) 10 MCG (400 UNIT) Cap Take  by mouth.     • ipratropium (ATROVENT) 0.02 % Solution 1 vial via nebulizer up to 3 times daily as needed for shortness of breath 30 Each 1   • EPINEPHrine Base (PRIMATENE MIST INH) Inhale  by mouth.       Current Facility-Administered Medications Ordered in Epic   Medication Dose Route Frequency Provider Last Rate Last Admin   • methylPREDNISolone sod succ (SOLU-MEDROL) 125 MG injection 62.5 mg  62.5 mg Intramuscular Once TRENTON Sheridan           Past Medical History:   Diagnosis Date   • Asthma        Social History     Tobacco Use   • Smoking status: Former Smoker     Quit date: 2020     Years since quittin.6   • Smokeless tobacco: Never Used   Substance Use Topics   • Alcohol use: Not Currently     Comment: rare   • Drug use: Never       No family status information on file.   History reviewed. No pertinent family history.    ROS:  Review of Systems   Constitutional: Positive for fatigue, malaise. Negative for fever, weight loss.  HENT: Positive for rhinitis, sore throat. Negative for ear pain, nosebleeds, and neck pain.    Eyes: Negative for vision changes.   Cardiovascular: Negative for chest pain, palpitations, orthopnea and leg swelling.   Respiratory: Positive for cough with sputum production, wheezing.   Gastrointestinal: Negative for abdominal pain, vomiting or diarrhea.   Skin: Negative for rash, diaphoresis.     Exam:  /82   Pulse 97   Temp 36.6 °C (97.9 °F) (Temporal)   Resp 18   Ht 1.753 m (5' 9\")   Wt (!) 141 kg (311 lb)   SpO2 89%   General: well-nourished, well-developed male in NAD  Head: normocephalic, atraumatic  Eyes: PERRLA, EOM within normal limits, no conjunctival injection, no scleral icterus, visual fields and acuity grossly intact.  Ears: normal shape and symmetry, no tenderness, no discharge. External canals are without any significant " edema or erythema. Tympanic membranes are without any inflammation, no effusion. Gross auditory acuity is intact.  Nose: symmetrical without tenderness, mild discharge, erythema present bilateral nares.  Mouth/Throat: reasonable hygiene, no exudates or tonsillar enlargement. Mild erythema present.   Neck: no masses, range of motion within normal limits, no tracheal deviation.  Lymph: mild cervical adenopathy. No supraclavicular adenopathy.   Neuro: alert and oriented. Cranial nerves 1-12 grossly intact.   Cardiovascular: regular rate and rhythm without murmurs, rubs, or gallops. No edema.   Pulmonary: no distress, patient able speak in full sentences. Chest is symmetrical with respiration. Diminished throughout, wheezes noted, fine rhonchi at bases.  Musculoskeletal: appropriate muscle tone, gait is stable.   Skin: warm, dry, intact, no clubbing, no cyanosis.   Psych: appropriate mood, affect, judgement.       DX chest radiology readin.  Possible pulmonary infiltrate nodule or mass noted in the left lung base. CT chest without contrast can be obtained for further evaluation if clinically indicated.     2.  Minimal linear and poorly defined opacifications are noted in each lung base which could be due to discoid atelectasis scarring or edema. Pneumonitis or developing pneumonia is also possible.        Assessment/Plan:  1. Asthma with acute exacerbation, unspecified asthma severity, unspecified whether persistent  albuterol (PROVENTIL) 2.5mg/3ml Nebu Soln solution for nebulization    albuterol 108 (90 Base) MCG/ACT Aero Soln inhalation aerosol    budesonide-formoterol (SYMBICORT) 160-4.5 MCG/ACT Aerosol    montelukast (SINGULAIR) 10 MG Tab    predniSONE (DELTASONE) 10 MG Tab    methylPREDNISolone sod succ (SOLU-MEDROL) 125 MG injection 62.5 mg   2. Acute lower respiratory infection  azithromycin (ZITHROMAX) 250 MG Tab    benzonatate (TESSALON) 100 MG Cap    COVID/SARS CoV-2 PCR    DX-CHEST-2 VIEWS     amoxicillin-clavulanate (AUGMENTIN) 875-125 MG Tab   3. Abnormal chest x-ray                 Patient is a 45-year-old asthmatic male who presents to the urgent care with respiratory symptoms for the past 3 days.  He has run out of all of his asthmatic medications, therefore albuterol nebulizer, albuterol inhaler, Symbicort, and Singulair represcribed at prior prescribed doses.  He will follow-up with his PCP for further refills.  Furthermore, the patient is given Solu-Medrol injection in office, tolerated well.  He will be given a prescription for prednisone to start tomorrow.  X-ray is suspicious for early pneumonia, azithromycin and Augmentin as directed.  Furthermore, abnormality is noted on the x-ray, a repeat x-ray and/or CT scan is encouraged, this is discussed with the patient, he will follow-up with his PCP for repeat evaluation.  Tessalon Perles as needed.  He is instructed to  inhalers after discharge from urgent care, if he is unable to raise his oxygen percentage with albuterol he is instructed to go to the emergency department, he is in agreement.  He will also be tested for Covid, home quarantine advised. Discussed natural progression and sx care. Rest, increase fluids, hand and respiratory hygiene. STRICT ER precautions.   Supportive care, differential diagnoses, and indications for immediate follow-up discussed with patient.   Pathogenesis of diagnosis discussed including typical length and natural progression.  Instructed to return to clinic or nearest emergency department for any change in condition, further concerns, or worsening of symptoms.  Patient states understanding of the plan of care and discharge instructions.  I spent a total of 40 minutes with record review, exam, communication with the patient, and documentation of this encounter.          ASHLEY Sheridan.

## 2021-03-18 LAB
COVID ORDER STATUS COVID19: NORMAL
SARS-COV-2 RNA RESP QL NAA+PROBE: NOTDETECTED
SPECIMEN SOURCE: NORMAL

## 2022-09-25 ENCOUNTER — OFFICE VISIT (OUTPATIENT)
Dept: URGENT CARE | Facility: PHYSICIAN GROUP | Age: 47
End: 2022-09-25
Payer: MEDICAID

## 2022-09-25 VITALS
HEART RATE: 97 BPM | TEMPERATURE: 97.9 F | RESPIRATION RATE: 18 BRPM | SYSTOLIC BLOOD PRESSURE: 172 MMHG | DIASTOLIC BLOOD PRESSURE: 92 MMHG | HEIGHT: 72 IN | OXYGEN SATURATION: 99 % | BODY MASS INDEX: 42.18 KG/M2

## 2022-09-25 DIAGNOSIS — S01.312A LACERATION OF LEFT EARLOBE, INITIAL ENCOUNTER: ICD-10-CM

## 2022-09-25 DIAGNOSIS — S09.90XA CLOSED HEAD INJURY, INITIAL ENCOUNTER: ICD-10-CM

## 2022-09-25 DIAGNOSIS — R03.0 ELEVATED BLOOD PRESSURE READING: ICD-10-CM

## 2022-09-25 PROCEDURE — 99213 OFFICE O/P EST LOW 20 MIN: CPT | Mod: 25 | Performed by: FAMILY MEDICINE

## 2022-09-25 PROCEDURE — 12002 RPR S/N/AX/GEN/TRNK2.6-7.5CM: CPT | Performed by: FAMILY MEDICINE

## 2022-09-25 RX ORDER — CEPHALEXIN 500 MG/1
500 CAPSULE ORAL 2 TIMES DAILY
Qty: 6 CAPSULE | Refills: 0 | Status: SHIPPED | OUTPATIENT
Start: 2022-09-25 | End: 2022-09-28

## 2022-09-25 NOTE — PROGRESS NOTES
Subjective     King Heart is a 47 y.o. male who presents with Ear Laceration (Fell out of bed in his sleep? COPD difficult sleeping, headache, hit head on bedside table)      - This is a pleasant and nontoxic appearing 47 y.o. male who has come to the walk-in clinic today for:    #1) rolled out of bed early today and when was getting back up hit Lt ear into corner of end table and cut himself. No LOC, no injury elsewhere. Has mild pain over Lt ear/side head where he struck table. Feels well otherwise. No limb weakness/numbness. Last tetanus 2 yrs ago.     Hx HTN, says takes meds. I do not see any listed in Epic. Have asked him to f/u w/ PCP for better control of BP      ALLERGIES:  Patient has no known allergies.     PMH:  Past Medical History:   Diagnosis Date    Asthma         PSH:  Past Surgical History:   Procedure Laterality Date    LAMINOTOMY         MEDS:    Current Outpatient Medications:     cephALEXin (KEFLEX) 500 MG Cap, Take 1 Capsule by mouth 2 times a day for 3 days., Disp: 6 Capsule, Rfl: 0    mupirocin (BACTROBAN) 2 % Ointment, Apply 1 Application topically 2 times a day., Disp: 22 g, Rfl: 0    albuterol (PROVENTIL) 2.5mg/3ml Nebu Soln solution for nebulization, Take 3 mL by nebulization every four hours as needed for Shortness of Breath., Disp: 30 Bullet, Rfl: 0    albuterol 108 (90 Base) MCG/ACT Aero Soln inhalation aerosol, Inhale 2 Puffs every 6 hours as needed for Shortness of Breath., Disp: 8 g, Rfl: 1    budesonide-formoterol (SYMBICORT) 160-4.5 MCG/ACT Aerosol, Inhale 2 Puffs 2 Times a Day., Disp: 10.2 g, Rfl: 0    montelukast (SINGULAIR) 10 MG Tab, Take 1 tablet by mouth every day., Disp: 30 tablet, Rfl: 0    Cholecalciferol (VITAMIN D3) 10 MCG (400 UNIT) Cap, Take  by mouth., Disp: , Rfl:     ipratropium (ATROVENT) 0.02 % Solution, 1 vial via nebulizer up to 3 times daily as needed for shortness of breath, Disp: 30 Each, Rfl: 1    EPINEPHrine Base (PRIMATENE MIST INH), Inhale  by  mouth., Disp: , Rfl:     ** I have documented what I find to be significant in regards to past medical, social, family and surgical history  in my HPI or under PMH/PSH/FH review section, otherwise it is noncontributory **           HPI    Review of Systems   All other systems reviewed and are negative.           Objective     BP (!) 172/92 (BP Location: Right arm, Patient Position: Sitting, BP Cuff Size: Adult)   Pulse 97   Temp 36.6 °C (97.9 °F) (Temporal)   Resp 18   Ht 1.829 m (6')   SpO2 99%   BMI 42.18 kg/m²      Physical Exam  Vitals and nursing note reviewed.   Constitutional:       General: He is not in acute distress.     Appearance: Normal appearance. He is well-developed.   HENT:      Head: Normocephalic.      Ears:      Comments: Lt ear: post auricular area w/ simple clean well approximated 3cm lac.   Cardiovascular:      Heart sounds: Normal heart sounds. No murmur heard.  Pulmonary:      Effort: Pulmonary effort is normal. No respiratory distress.   Neurological:      Mental Status: He is alert.      Motor: No abnormal muscle tone.   Psychiatric:         Mood and Affect: Mood normal.         Behavior: Behavior normal.                           Assessment & Plan     1. Closed head injury, initial encounter        2. Laceration of left earlobe, initial encounter  cephALEXin (KEFLEX) 500 MG Cap    mupirocin (BACTROBAN) 2 % Ointment      3. Elevated blood pressure reading  Referral to establish with [facility] PCP          - Dx, plan & d/c instructions discussed   - Rest, stay hydrated, OTC Tylenol as needed  - Head trauma precautions and E.R. precautions discussed     Follow up with your regular primary care providers office for a recheck on today's visit of uncontrolled BP. ER if not improving in 2-3 days or if feeling/getting worse.     Any realistic side effects of medications that may have been given today reviewed.     Patient left in stable condition     Pertinent prior office visit notes in Epic  have been reviewed by me today on day of this visit.         Procedure: Laceration Repair    - Wound cleaned and/or irrigated w/ NS by MA.  - Clean technique used for procedure   - Local anesthesia with 2% lidocaine  - Closed with #2  3-0 Nylon interrupted sutures with good wound approximation. Care was taken not to disturb/injure underlying bones, joint, joint-space, tendons, nerves and major vessels   - Polysporin and dressing placed  - Patient tolerated well  - wound care/instructions discussed  - 2 day wound check advised

## 2022-09-27 ENCOUNTER — OFFICE VISIT (OUTPATIENT)
Dept: URGENT CARE | Facility: PHYSICIAN GROUP | Age: 47
End: 2022-09-27
Payer: MEDICAID

## 2022-09-27 VITALS
OXYGEN SATURATION: 93 % | HEIGHT: 72 IN | RESPIRATION RATE: 20 BRPM | HEART RATE: 121 BPM | TEMPERATURE: 97.8 F | BODY MASS INDEX: 42.12 KG/M2 | WEIGHT: 311 LBS | SYSTOLIC BLOOD PRESSURE: 120 MMHG | DIASTOLIC BLOOD PRESSURE: 80 MMHG

## 2022-09-27 DIAGNOSIS — Z51.89 VISIT FOR WOUND CHECK: ICD-10-CM

## 2022-09-27 PROCEDURE — 99212 OFFICE O/P EST SF 10 MIN: CPT | Performed by: PHYSICIAN ASSISTANT

## 2022-09-27 ASSESSMENT — ENCOUNTER SYMPTOMS
TINGLING: 0
HEADACHES: 0
FEVER: 0
CHILLS: 0
DIZZINESS: 0

## 2022-09-27 NOTE — PROGRESS NOTES
Subjective:     CHIEF COMPLAINT  Chief Complaint   Patient presents with    Wound Check       HPI  King Heart is a very pleasant 47 y.o. male who presents to the clinic today for wound check.  Patient sustained a laceration behind his left ear 2 days ago.  Had 2 simple interrupted sutures placed in clinic.  Has been applying topical antibiotic and keeping the wound clean.  He is starting on his Keflex the day.  Denies any pain, discharge or drainage.  No headache, dizziness or lightheadedness.  No fevers or chills.    REVIEW OF SYSTEMS  Review of Systems   Constitutional:  Negative for chills and fever.   Skin:         Left posterior ear laceration follow-up   Neurological:  Negative for dizziness, tingling and headaches.     PAST MEDICAL HISTORY  Patient Active Problem List    Diagnosis Date Noted    Major depressive disorder 09/17/2020    Abdominal hernia 09/17/2020    LAMONT (obstructive sleep apnea) 09/17/2020    Moderate persistent asthma without complication 09/17/2020    Mixed hyperlipidemia 09/17/2020    Chronic midline low back pain 09/17/2020       SURGICAL HISTORY   has a past surgical history that includes laminotomy.    ALLERGIES  No Known Allergies    CURRENT MEDICATIONS  Home Medications       Reviewed by Louis Musa P.A.-C. (Physician Assistant) on 09/27/22 at 1119  Med List Status: <None>     Medication Last Dose Status   albuterol (PROVENTIL) 2.5mg/3ml Nebu Soln solution for nebulization Taking Active   albuterol 108 (90 Base) MCG/ACT Aero Soln inhalation aerosol Taking Active   budesonide-formoterol (SYMBICORT) 160-4.5 MCG/ACT Aerosol Taking Active   cephALEXin (KEFLEX) 500 MG Cap Taking Active   Cholecalciferol (VITAMIN D3) 10 MCG (400 UNIT) Cap Taking Active   EPINEPHrine Base (PRIMATENE MIST INH) Taking Active   ipratropium (ATROVENT) 0.02 % Solution Taking Active   montelukast (SINGULAIR) 10 MG Tab Taking Active   mupirocin (BACTROBAN) 2 % Ointment Taking Active                     SOCIAL HISTORY  Social History     Tobacco Use    Smoking status: Former     Types: Cigarettes     Quit date: 2020     Years since quittin.1    Smokeless tobacco: Never   Vaping Use    Vaping Use: Never used   Substance and Sexual Activity    Alcohol use: Not Currently     Comment: rare    Drug use: Never    Sexual activity: Not Currently       FAMILY HISTORY  History reviewed. No pertinent family history.       Objective:     VITAL SIGNS: /80   Pulse (!) 121   Temp 36.6 °C (97.8 °F) (Temporal)   Resp 20   Ht 1.829 m (6')   Wt (!) 141 kg (311 lb)   SpO2 93%   BMI 42.18 kg/m²     PHYSICAL EXAM  Physical Exam  Constitutional:       Appearance: Normal appearance.   HENT:      Ears:      Comments: Small laceration behind the left ear with good wound approximation.  2 simple interrupted sutures in place.  No wound dehiscence.  No redness, discharge or drainage.  Musculoskeletal:      Cervical back: Normal range of motion.   Neurological:      General: No focal deficit present.      Mental Status: He is alert and oriented to person, place, and time. Mental status is at baseline.       Assessment/Plan:     1. Visit for wound check      MDM/Comments:    Appears to be healing appropriately at this time.  No wound dehiscence or signs of infection.  Patient has follow-up with his PCP in 2 days.  He is going to schedule a visit for suture removal in 4 days.  Return sooner for any signs of infection.    Differential diagnosis, natural history, supportive care, and indications for immediate follow-up discussed. All questions answered. Patient agrees with the plan of care.    Follow-up as needed if symptoms worsen or fail to improve to PCP, Urgent care or Emergency Room.    I have personally reviewed prior external notes and test results pertinent to today's visit.  I have independently reviewed and interpreted all diagnostics ordered during this urgent care acute visit.   Discussed management options  (risks,benefits, and alternatives to treatment). Pt expresses understanding and the treatment plan was agreed upon. Questions were encouraged and answered to pt's satisfaction.    Please note that this dictation was created using voice recognition software. I have made a reasonable attempt to correct obvious errors, but I expect that there are errors of grammar and possibly content that I did not discover before finalizing the note.

## 2022-10-02 ENCOUNTER — OFFICE VISIT (OUTPATIENT)
Dept: URGENT CARE | Facility: PHYSICIAN GROUP | Age: 47
End: 2022-10-02
Payer: MEDICAID

## 2022-10-02 VITALS
DIASTOLIC BLOOD PRESSURE: 78 MMHG | BODY MASS INDEX: 42.66 KG/M2 | OXYGEN SATURATION: 92 % | SYSTOLIC BLOOD PRESSURE: 152 MMHG | TEMPERATURE: 96.4 F | HEART RATE: 74 BPM | HEIGHT: 72 IN | WEIGHT: 315 LBS | RESPIRATION RATE: 16 BRPM

## 2022-10-02 DIAGNOSIS — Z48.02 VISIT FOR SUTURE REMOVAL: ICD-10-CM

## 2022-10-02 PROCEDURE — 99212 OFFICE O/P EST SF 10 MIN: CPT | Performed by: PHYSICIAN ASSISTANT

## 2022-10-02 NOTE — PROGRESS NOTES
Subjective:   King Heart is a 47 y.o. male who presents for Suture / Staple Removal (Behind left ear x2 sutures. )      HPI  The patient presents to the Urgent Care for suture removal from behind his left ear.  He sustained a laceration 7 days ago.  Wound is healing well.  No drainage.  No fevers or chills.  No pain.        Medications:    albuterol Aers  albuterol Nebu  budesonide-formoterol Aero  ipratropium Soln  montelukast Tabs  mupirocin Oint  PRIMATENE MIST INH  Vitamin D3 Caps    Allergies: Patient has no known allergies.    Problem List: King Heart does not have any pertinent problems on file.    Surgical History:  Past Surgical History:   Procedure Laterality Date    LAMINOTOMY         Past Social Hx: King Heart  reports that he quit smoking about 2 years ago. His smoking use included cigarettes. He has never used smokeless tobacco. He reports that he does not currently use alcohol. He reports that he does not use drugs.     Past Family Hx:  King Heart family history is not on file.     Problem list, medications, and allergies reviewed by myself today in Epic.     Objective:     BP (!) 152/78   Pulse 74   Temp (!) 35.8 °C (96.4 °F) (Temporal)   Resp 16   Ht 1.829 m (6')   Wt (!) 155 kg (342 lb)   SpO2 89% Comment: cat allergy  BMI 46.38 kg/m²     Physical Exam  Vitals reviewed.   Constitutional:       General: He is not in acute distress.     Appearance: Normal appearance. He is not ill-appearing or toxic-appearing.   HENT:      Ears:      Comments: Well-healed laceration to the left postauricular area.  2 interrupted sutures in place.  No wound dehiscence.  Negative erythema, edema, drainage.  Negative tenderness.  Eyes:      Conjunctiva/sclera: Conjunctivae normal.      Pupils: Pupils are equal, round, and reactive to light.   Cardiovascular:      Rate and Rhythm: Normal rate.   Pulmonary:      Effort: Pulmonary effort is normal.   Musculoskeletal:      Cervical back:  Neck supple.   Skin:     General: Skin is warm and dry.   Neurological:      General: No focal deficit present.      Mental Status: He is alert and oriented to person, place, and time.   Psychiatric:         Mood and Affect: Mood normal.         Behavior: Behavior normal.     Suture/ Staple Removal Procedure    Indication: Wound healed    Procedure: The patient was placed in the appropriate position and two interuppted sutures were removed without difficulty.    Other items: None    The patient tolerated the procedure well.    Complications: None    Diagnosis and associated orders:     1. Visit for suture removal     Comments/MDM:     Wound healed.   Sutures removed as above. No complications.   Return as needed.        Patient expresses understanding and agrees to plan. Patient denies any other questions or concerns.     Please note that this dictation was created using voice recognition software. I have made a reasonable attempt to correct obvious errors, but I expect that there are errors of grammar and possibly content that I did not discover before finalizing the note.    This note was electronically signed by Gonzalo Laguerre PA-C

## 2022-12-22 ENCOUNTER — HOSPITAL ENCOUNTER (OUTPATIENT)
Facility: MEDICAL CENTER | Age: 47
End: 2022-12-22
Attending: NURSE PRACTITIONER
Payer: MEDICAID

## 2022-12-22 ENCOUNTER — APPOINTMENT (OUTPATIENT)
Dept: RADIOLOGY | Facility: IMAGING CENTER | Age: 47
End: 2022-12-22
Attending: NURSE PRACTITIONER
Payer: MEDICAID

## 2022-12-22 ENCOUNTER — APPOINTMENT (OUTPATIENT)
Dept: URGENT CARE | Facility: PHYSICIAN GROUP | Age: 47
End: 2022-12-22

## 2022-12-22 ENCOUNTER — OFFICE VISIT (OUTPATIENT)
Dept: URGENT CARE | Facility: PHYSICIAN GROUP | Age: 47
End: 2022-12-22
Payer: MEDICAID

## 2022-12-22 VITALS
WEIGHT: 315 LBS | BODY MASS INDEX: 46.65 KG/M2 | HEART RATE: 95 BPM | HEIGHT: 69 IN | SYSTOLIC BLOOD PRESSURE: 132 MMHG | RESPIRATION RATE: 20 BRPM | DIASTOLIC BLOOD PRESSURE: 84 MMHG | TEMPERATURE: 99.3 F | OXYGEN SATURATION: 89 %

## 2022-12-22 DIAGNOSIS — R06.02 SOB (SHORTNESS OF BREATH): ICD-10-CM

## 2022-12-22 DIAGNOSIS — J44.1 COPD WITH ACUTE EXACERBATION (HCC): ICD-10-CM

## 2022-12-22 DIAGNOSIS — J98.11 ATELECTASIS: ICD-10-CM

## 2022-12-22 DIAGNOSIS — U07.1 COVID-19: ICD-10-CM

## 2022-12-22 PROCEDURE — 99214 OFFICE O/P EST MOD 30 MIN: CPT | Mod: 25,CS | Performed by: NURSE PRACTITIONER

## 2022-12-22 PROCEDURE — 94640 AIRWAY INHALATION TREATMENT: CPT | Performed by: NURSE PRACTITIONER

## 2022-12-22 PROCEDURE — 0240U HCHG SARS-COV-2 COVID-19 NFCT DS RESP RNA 3 TRGT MIC: CPT

## 2022-12-22 PROCEDURE — 71046 X-RAY EXAM CHEST 2 VIEWS: CPT | Mod: TC,FY | Performed by: NURSE PRACTITIONER

## 2022-12-22 RX ORDER — IPRATROPIUM BROMIDE AND ALBUTEROL SULFATE 2.5; .5 MG/3ML; MG/3ML
3 SOLUTION RESPIRATORY (INHALATION) ONCE
Status: COMPLETED | OUTPATIENT
Start: 2022-12-22 | End: 2022-12-22

## 2022-12-22 RX ORDER — HYDROCODONE BITARTRATE AND ACETAMINOPHEN 5; 325 MG/1; MG/1
TABLET ORAL
COMMUNITY
Start: 2022-12-07

## 2022-12-22 RX ORDER — ALBUTEROL SULFATE 90 UG/1
1-2 AEROSOL, METERED RESPIRATORY (INHALATION) EVERY 4 HOURS PRN
Qty: 8.5 G | Refills: 0 | Status: SHIPPED | OUTPATIENT
Start: 2022-12-22

## 2022-12-22 RX ORDER — GABAPENTIN 100 MG/1
CAPSULE ORAL
COMMUNITY

## 2022-12-22 RX ORDER — DEXAMETHASONE SODIUM PHOSPHATE 10 MG/ML
10 INJECTION INTRAMUSCULAR; INTRAVENOUS ONCE
Status: COMPLETED | OUTPATIENT
Start: 2022-12-22 | End: 2022-12-22

## 2022-12-22 RX ORDER — ATORVASTATIN CALCIUM 40 MG/1
40 TABLET, FILM COATED ORAL DAILY
COMMUNITY
Start: 2022-10-29

## 2022-12-22 RX ORDER — CYCLOBENZAPRINE HCL 10 MG
TABLET ORAL
COMMUNITY

## 2022-12-22 RX ORDER — PREDNISONE 20 MG/1
40 TABLET ORAL DAILY
Qty: 10 TABLET | Refills: 0 | Status: SHIPPED | OUTPATIENT
Start: 2022-12-22 | End: 2022-12-27

## 2022-12-22 RX ORDER — BUPROPION HYDROCHLORIDE 150 MG/1
150 TABLET, EXTENDED RELEASE ORAL 2 TIMES DAILY
COMMUNITY
Start: 2022-10-21

## 2022-12-22 RX ORDER — AMLODIPINE BESYLATE 5 MG/1
5 TABLET ORAL
COMMUNITY
Start: 2022-11-28

## 2022-12-22 RX ORDER — IPRATROPIUM BROMIDE AND ALBUTEROL SULFATE 2.5; .5 MG/3ML; MG/3ML
SOLUTION RESPIRATORY (INHALATION)
COMMUNITY
Start: 2022-11-17

## 2022-12-22 RX ORDER — DOXYCYCLINE HYCLATE 100 MG
100 TABLET ORAL 2 TIMES DAILY
Qty: 14 TABLET | Refills: 0 | Status: SHIPPED | OUTPATIENT
Start: 2022-12-22 | End: 2022-12-29

## 2022-12-22 RX ORDER — METHOCARBAMOL 750 MG/1
TABLET, FILM COATED ORAL
COMMUNITY
Start: 2022-12-07

## 2022-12-22 RX ORDER — HYDROXYZINE HYDROCHLORIDE 25 MG/1
25 TABLET, FILM COATED ORAL 4 TIMES DAILY PRN
COMMUNITY
Start: 2022-10-29

## 2022-12-22 RX ORDER — DEXAMETHASONE SODIUM PHOSPHATE 10 MG/ML
6 INJECTION INTRAMUSCULAR; INTRAVENOUS ONCE
Status: COMPLETED | OUTPATIENT
Start: 2022-12-22 | End: 2022-12-22

## 2022-12-22 RX ADMIN — DEXAMETHASONE SODIUM PHOSPHATE 6 MG: 10 INJECTION INTRAMUSCULAR; INTRAVENOUS at 15:30

## 2022-12-22 RX ADMIN — DEXAMETHASONE SODIUM PHOSPHATE 10 MG: 10 INJECTION INTRAMUSCULAR; INTRAVENOUS at 14:19

## 2022-12-22 RX ADMIN — IPRATROPIUM BROMIDE AND ALBUTEROL SULFATE 3 ML: 2.5; .5 SOLUTION RESPIRATORY (INHALATION) at 16:31

## 2022-12-22 ASSESSMENT — ENCOUNTER SYMPTOMS
SWEATS: 1
SHORTNESS OF BREATH: 1
NAUSEA: 1
SORE THROAT: 0
CHILLS: 1
FATIGUE: 1
SPUTUM PRODUCTION: 1
VOMITING: 1
FEVER: 1
WHEEZING: 1
DIARRHEA: 1
HEMOPTYSIS: 0
COUGH: 1
HEADACHES: 1
MYALGIAS: 1

## 2022-12-22 ASSESSMENT — COPD QUESTIONNAIRES: COPD: 1

## 2022-12-22 NOTE — PROGRESS NOTES
Subjective:     King Heart is a 47 y.o. male who presents for Congestion (X 1 week. Pt states he does have asthma and COPD), Headache, Body Aches, Chills, Sweats, and Fatigue      Cold started last week, Thursday or Friday. Out of albuterol. Using OTC primatene mist.     Headache  Chills  Associated symptoms include chills, congestion, coughing, fatigue, a fever, headaches, myalgias, nausea and vomiting. Pertinent negatives include no chest pain or sore throat.   Sweats  Associated symptoms include chills, congestion, coughing, fatigue, a fever, headaches, myalgias, nausea and vomiting. Pertinent negatives include no chest pain or sore throat.   Fatigue  Associated symptoms include chills, congestion, coughing, fatigue, a fever, headaches, myalgias, nausea and vomiting. Pertinent negatives include no chest pain or sore throat.   Cough  This is a new problem. The current episode started in the past 7 days. The problem has been gradually worsening. Associated symptoms include chills, a fever, headaches, myalgias, nasal congestion, shortness of breath, sweats and wheezing. Pertinent negatives include no chest pain, ear pain, hemoptysis or sore throat. His past medical history is significant for asthma and COPD.     Past Medical History:   Diagnosis Date    Asthma        Past Surgical History:   Procedure Laterality Date    LAMINOTOMY         Social History     Socioeconomic History    Marital status:      Spouse name: Not on file    Number of children: Not on file    Years of education: Not on file    Highest education level: Not on file   Occupational History    Not on file   Tobacco Use    Smoking status: Former     Types: Cigarettes     Quit date: 2020     Years since quittin.3    Smokeless tobacco: Never   Vaping Use    Vaping Use: Never used   Substance and Sexual Activity    Alcohol use: Not Currently     Comment: rare    Drug use: Never    Sexual activity: Not Currently   Other Topics  "Concern    Not on file   Social History Narrative    Not on file     Social Determinants of Health     Financial Resource Strain: Not on file   Food Insecurity: Not on file   Transportation Needs: Not on file   Physical Activity: Not on file   Stress: Not on file   Social Connections: Not on file   Intimate Partner Violence: Not on file   Housing Stability: Not on file        No family history on file.     No Known Allergies    Review of Systems   Constitutional:  Positive for chills, fatigue, fever and malaise/fatigue.   HENT:  Positive for congestion. Negative for ear pain and sore throat.    Respiratory:  Positive for cough, sputum production, shortness of breath and wheezing. Negative for hemoptysis.    Cardiovascular:  Negative for chest pain.   Gastrointestinal:  Positive for diarrhea, nausea and vomiting.   Musculoskeletal:  Positive for myalgias.   Neurological:  Positive for headaches.   All other systems reviewed and are negative.     Objective:   /84   Pulse 95   Temp 37.4 °C (99.3 °F) (Temporal)   Resp 20   Ht 1.753 m (5' 9\")   Wt (!) 154 kg (339 lb 9.6 oz)   SpO2 89%   BMI 50.15 kg/m²     Physical Exam  Vitals reviewed.   Constitutional:       General: He is not in acute distress.     Appearance: He is well-developed. He is ill-appearing.   HENT:      Head: Normocephalic and atraumatic.      Right Ear: Ear canal and external ear normal.      Left Ear: Ear canal and external ear normal.      Nose: Congestion present.      Mouth/Throat:      Mouth: Mucous membranes are moist.      Pharynx: Posterior oropharyngeal erythema present.   Eyes:      Conjunctiva/sclera:      Right eye: Right conjunctiva is injected.      Left eye: Left conjunctiva is injected.   Cardiovascular:      Rate and Rhythm: Tachycardia present.   Pulmonary:      Effort: Pulmonary effort is normal. Tachypnea present. No respiratory distress.      Breath sounds: No stridor. Examination of the right-upper field reveals " wheezing. Examination of the left-upper field reveals wheezing. Examination of the right-lower field reveals wheezing. Examination of the left-lower field reveals wheezing. Wheezing present. No decreased breath sounds, rhonchi or rales.      Comments: RR 26  Musculoskeletal:         General: Normal range of motion.      Cervical back: Normal range of motion.   Skin:     General: Skin is warm and dry.      Findings: No rash.   Neurological:      Mental Status: He is alert and oriented to person, place, and time.      GCS: GCS eye subscore is 4. GCS verbal subscore is 5. GCS motor subscore is 6.   Psychiatric:         Speech: Speech normal.         Behavior: Behavior normal.         Thought Content: Thought content normal.         Judgment: Judgment normal.       Assessment/Plan:   1. Viral respiratory infection  - CoV-2 and Flu A/B by PCR (24 hour In-House): Collect NP swab in [facility]; Future    2. COPD with acute exacerbation (HCC)  - DX-CHEST-2 VIEWS; Future  - dexamethasone (DECADRON) injection (check route below) 10 mg  - ipratropium-albuterol (DUONEB) nebulizer solution  - predniSONE (DELTASONE) 20 MG Tab; Take 2 Tablets by mouth every day for 5 days.  Dispense: 10 Tablet; Refill: 0  - doxycycline (VIBRAMYCIN) 100 MG Tab; Take 1 Tablet by mouth 2 times a day for 7 days.  Dispense: 14 Tablet; Refill: 0  - dexamethasone (DECADRON) injection (check route below) 6 mg  - albuterol 108 (90 Base) MCG/ACT Aero Soln inhalation aerosol; Inhale 1-2 Puffs every four hours as needed for Shortness of Breath.  Dispense: 8.5 g; Refill: 0    3. SOB (shortness of breath)  - DX-CHEST-2 VIEWS; Future  - dexamethasone (DECADRON) injection (check route below) 10 mg  - ipratropium-albuterol (DUONEB) nebulizer solution  - predniSONE (DELTASONE) 20 MG Tab; Take 2 Tablets by mouth every day for 5 days.  Dispense: 10 Tablet; Refill: 0  - dexamethasone (DECADRON) injection (check route below) 6 mg  - albuterol 108 (90 Base) MCG/ACT Aero Soln  inhalation aerosol; Inhale 1-2 Puffs every four hours as needed for Shortness of Breath.  Dispense: 8.5 g; Refill: 0  DX-CHEST-2 VIEWS    Result Date: 12/22/2022 12/22/2022 2:03 PM HISTORY/REASON FOR EXAM:  Shortness of Breath. TECHNIQUE/EXAM DESCRIPTION AND NUMBER OF VIEWS: Two views of the chest. COMPARISON:  03/17/2021 FINDINGS: The heart is normal in size. No pulmonary infiltrates or consolidations are noted. Mild linear opacifications are noted in each lung base. No pleural effusions are appreciated.     1.  No consolidations identified. 2.  Mild basilar linear opacifications could be due to discoid atelectasis.    Symptomatic care.  -Oral hydration and rest.   -Cough control: nonpharmacologic options for cough relief such as throat lozenges, hot tea, honey.  -Over the counter expectorant as directed; Guaifenesin (Mucinex).  -Tylenol or ibuprofen for pain and fever as directed.   -Warm salt water gargles.  -OTC Throat lozenges or spray (Cepacol).    Seek emergency medical care immediately for: Trouble breathing, persistent pain or pressure in the chest, confusion, inability to wake or stay awake, bluish lips or face, persistent tachycardia (fast heart rate), prolonged dizziness, persistent high grade fevers. Follow up for prolonged cough, persistent wheezing, persistent throat pain, difficulty swallowing, persistent fevers, leg swelling, or any other concerns. Follow up with your Primary Care Provider.     -Discussed viral etiology. COVID S&S, and self isolation guidelines; continue to wear mask. Improved work of breathing. Patient states he's breathing mch better. Has had improvement with tx. Is able to monitor home O2 saturation. Given strict ED precautions. Hx of atelectasis on imaging.     Differential diagnosis, natural history, supportive care, and indications for immediate follow-up discussed.

## 2022-12-23 DIAGNOSIS — B97.89 VIRAL RESPIRATORY INFECTION: ICD-10-CM

## 2022-12-23 DIAGNOSIS — J98.8 VIRAL RESPIRATORY INFECTION: ICD-10-CM

## 2022-12-23 LAB
FLUAV RNA SPEC QL NAA+PROBE: NEGATIVE
FLUBV RNA SPEC QL NAA+PROBE: NEGATIVE
SARS-COV-2 RNA RESP QL NAA+PROBE: DETECTED
SPECIMEN SOURCE: ABNORMAL

## 2023-01-02 DIAGNOSIS — J44.1 COPD WITH ACUTE EXACERBATION (HCC): ICD-10-CM

## 2023-01-02 DIAGNOSIS — R06.02 SOB (SHORTNESS OF BREATH): ICD-10-CM

## 2023-04-02 RX ORDER — ALBUTEROL SULFATE 90 UG/1
AEROSOL, METERED RESPIRATORY (INHALATION)
Qty: 18 G | OUTPATIENT
Start: 2023-04-02

## 2023-05-16 NOTE — ASSESSMENT & PLAN NOTE
This is a chronic condition, poorly controlled  He is taking albuterol inhaler as well as as needed nebulizer, Atrovent, recently completed steroid pack but states symptoms not significantly improved  He is also on singular 10 mg daily  He does deny purulent nasal drainage or discolored phlegm  He is requesting a second round of steroid pack, this is reasonable and has been called in  He also is requesting a new referral to pulmonology as well as sleep medicine in the setting of his asthma as well as history of obstructive sleep apnea  Referral has been placed   No

## 2024-08-02 ENCOUNTER — OFFICE VISIT (OUTPATIENT)
Dept: URGENT CARE | Facility: PHYSICIAN GROUP | Age: 49
End: 2024-08-02
Payer: MEDICAID

## 2024-08-02 ENCOUNTER — APPOINTMENT (OUTPATIENT)
Dept: URGENT CARE | Facility: PHYSICIAN GROUP | Age: 49
End: 2024-08-02

## 2024-08-02 VITALS
RESPIRATION RATE: 20 BRPM | DIASTOLIC BLOOD PRESSURE: 78 MMHG | SYSTOLIC BLOOD PRESSURE: 142 MMHG | OXYGEN SATURATION: 94 % | HEIGHT: 69 IN | BODY MASS INDEX: 45.83 KG/M2 | WEIGHT: 309.4 LBS | HEART RATE: 92 BPM | TEMPERATURE: 97.1 F

## 2024-08-02 DIAGNOSIS — J44.1 COPD WITH ACUTE EXACERBATION (HCC): ICD-10-CM

## 2024-08-02 DIAGNOSIS — U07.1 COVID-19: ICD-10-CM

## 2024-08-02 DIAGNOSIS — Z03.818 ENCOUNTER FOR PATIENT CONCERN ABOUT EXPOSURE TO INFECTIOUS ORGANISM: ICD-10-CM

## 2024-08-02 DIAGNOSIS — Z72.0 TOBACCO USER: ICD-10-CM

## 2024-08-02 DIAGNOSIS — R03.0 ELEVATED BLOOD PRESSURE READING: ICD-10-CM

## 2024-08-02 LAB
FLUAV RNA SPEC QL NAA+PROBE: NEGATIVE
FLUBV RNA SPEC QL NAA+PROBE: NEGATIVE
RSV RNA SPEC QL NAA+PROBE: NEGATIVE
SARS-COV-2 RNA RESP QL NAA+PROBE: POSITIVE

## 2024-08-02 PROCEDURE — 87637 SARSCOV2&INF A&B&RSV AMP PRB: CPT | Mod: QW | Performed by: FAMILY MEDICINE

## 2024-08-02 PROCEDURE — 99406 BEHAV CHNG SMOKING 3-10 MIN: CPT | Performed by: FAMILY MEDICINE

## 2024-08-02 PROCEDURE — 99214 OFFICE O/P EST MOD 30 MIN: CPT | Mod: 25 | Performed by: FAMILY MEDICINE

## 2024-08-02 RX ORDER — METHYLPREDNISOLONE 4 MG
TABLET, DOSE PACK ORAL
Qty: 21 TABLET | Refills: 0 | Status: SHIPPED | OUTPATIENT
Start: 2024-08-02

## 2024-08-02 NOTE — PROGRESS NOTES
"  Subjective:      49 y.o. male presents to urgent care for cold symptoms that started on Wednesday. He is experiencing fever, headaches, body aches, sore throat, and cough. No diarrhea. He smokes an average of 0.25 packs of cigarettes per day. He does have asthma for which he uses Symbicort and albuterol. Typically he uses his albuterol 3 times daily, but since getting sick he has been using it 6 times daily. He has been monitoring his oxygen levels at home and notes they are 82-90%. He is vaccinated against COVID. No known sick contacts.     Blood pressure is elevated today. He does have a history of hypertension for which he takes Amlodipine. He denies any chest pain or palpitations.     He denies any other questions or concerns at this time.    Current problem list, medication, and past medical/surgical history were reviewed in Epic.    ROS  See HPI     Objective:      BP (!) 142/78   Pulse 92   Temp 36.2 °C (97.1 °F) (Temporal)   Resp 20   Ht 1.753 m (5' 9\")   Wt (!) 140 kg (309 lb 6.4 oz)   SpO2 94%   BMI 45.69 kg/m²     Physical Exam  Constitutional:       General: He is not in acute distress.     Appearance: He is not diaphoretic.   Cardiovascular:      Rate and Rhythm: Normal rate and regular rhythm.      Heart sounds: Normal heart sounds.   Pulmonary:      Effort: Pulmonary effort is normal. No respiratory distress.      Breath sounds: Wheezing (throughough lung fields bilaterally) present.   Neurological:      Mental Status: He is alert.   Psychiatric:         Mood and Affect: Affect normal.         Judgment: Judgment normal.       Assessment/Plan:     1. COVID-19  2. COPD with acute exacerbation (HCC)  3. Encounter for patient concern about exposure to infectious organism  4. Elevated blood pressure reading  I encouraged mask use, frequent handwashing, wiping down hard surfaces, etc. Tylenol and Ibuprofen were recommended for symptomatic relief. Upon chart review GFR was found to be >60.  Paxlovid " (nirmatrelvir and ritonavir) is an FDA emergency use authorization drug intended for COVID-positive patients at high risk for severe disease including hospital and death; not FDA approved. Patient meets qualifications for this medication due to their history of asthma. Some adverse effects include: Hypersensitivity reaction, anaphylaxis, hepatotoxicity, hepatitis, jaundice, diarrhea and / or others up to and including death. Patient is currently without indication of need for higher level of care. Patient/Guardian was given precautionary signs/symptoms that mandate immediate follow up and evaluation in the ED. The patient and/or guardian demonstrated a good understanding and agreed with the treatment plan.  - methylPREDNISolone (MEDROL DOSEPAK) 4 MG Tablet Therapy Pack; Follow schedule on package instructions.  Dispense: 21 Tablet; Refill: 0  - Nirmatrelvir&Ritonavir 300/100 20 x 150 MG & 10 x 100MG Tablet Therapy Pack; Take 300 mg nirmatrelvir (two 150 mg tablets) with 100 mg ritonavir (one 100 mg tablet) by mouth, with all three tablets taken together twice daily for 5 days.  Dispense: 30 Each; Refill: 0    5. Tobacco user  4 minutes was spent in educating patient of health risks associated with tobacco, nicotine, and vaping. Verbalized understanding. He has been successful in quitting in the past. The longest he quit for was 6 months. He is interested in quitting again, I have given him the resource 1-800-quit-now      Instructed to return to Urgent Care or nearest Emergency Department if symptoms fail to improve, for any change in condition, further concerns, or new concerning symptoms. Patient states understanding of the plan of care and discharge instructions.    Liz Dobbs M.D.    may shower in 48 hours